# Patient Record
Sex: FEMALE | Race: WHITE | NOT HISPANIC OR LATINO | Employment: UNEMPLOYED | URBAN - METROPOLITAN AREA
[De-identification: names, ages, dates, MRNs, and addresses within clinical notes are randomized per-mention and may not be internally consistent; named-entity substitution may affect disease eponyms.]

---

## 2017-02-01 ENCOUNTER — APPOINTMENT (OUTPATIENT)
Dept: RADIOLOGY | Facility: MEDICAL CENTER | Age: 47
End: 2017-02-01
Attending: EMERGENCY MEDICINE
Payer: COMMERCIAL

## 2017-02-01 ENCOUNTER — APPOINTMENT (OUTPATIENT)
Dept: RADIOLOGY | Facility: MEDICAL CENTER | Age: 47
End: 2017-02-01
Attending: INTERNAL MEDICINE
Payer: COMMERCIAL

## 2017-02-01 ENCOUNTER — HOSPITAL ENCOUNTER (OUTPATIENT)
Facility: MEDICAL CENTER | Age: 47
End: 2017-02-02
Attending: EMERGENCY MEDICINE | Admitting: INTERNAL MEDICINE
Payer: COMMERCIAL

## 2017-02-01 ENCOUNTER — RESOLUTE PROFESSIONAL BILLING HOSPITAL PROF FEE (OUTPATIENT)
Dept: HOSPITALIST | Facility: MEDICAL CENTER | Age: 47
End: 2017-02-01
Payer: COMMERCIAL

## 2017-02-01 DIAGNOSIS — E87.1 HYPONATREMIA: ICD-10-CM

## 2017-02-01 DIAGNOSIS — M54.12 CERVICAL RADICULOPATHY, ACUTE: ICD-10-CM

## 2017-02-01 DIAGNOSIS — Z78.9 ALCOHOL USE: ICD-10-CM

## 2017-02-01 PROBLEM — R20.2 INTERMITTENT TINGLING SENSATION OF LEFT HAND AND FOOT: Status: ACTIVE | Noted: 2017-02-01

## 2017-02-01 LAB
ALBUMIN SERPL BCP-MCNC: 4.3 G/DL (ref 3.2–4.9)
ALBUMIN/GLOB SERPL: 1.5 G/DL
ALP SERPL-CCNC: 48 U/L (ref 30–99)
ALT SERPL-CCNC: 17 U/L (ref 2–50)
ANION GAP SERPL CALC-SCNC: 11 MMOL/L (ref 0–11.9)
AST SERPL-CCNC: 19 U/L (ref 12–45)
BASOPHILS # BLD AUTO: 1.9 % (ref 0–1.8)
BASOPHILS # BLD: 0.09 K/UL (ref 0–0.12)
BILIRUB SERPL-MCNC: 0.4 MG/DL (ref 0.1–1.5)
BUN SERPL-MCNC: 9 MG/DL (ref 8–22)
CALCIUM SERPL-MCNC: 9.4 MG/DL (ref 8.5–10.5)
CHLORIDE SERPL-SCNC: 92 MMOL/L (ref 96–112)
CO2 SERPL-SCNC: 22 MMOL/L (ref 20–33)
CREAT SERPL-MCNC: 0.68 MG/DL (ref 0.5–1.4)
EOSINOPHIL # BLD AUTO: 0.13 K/UL (ref 0–0.51)
EOSINOPHIL NFR BLD: 2.8 % (ref 0–6.9)
ERYTHROCYTE [DISTWIDTH] IN BLOOD BY AUTOMATED COUNT: 39.4 FL (ref 35.9–50)
GFR SERPL CREATININE-BSD FRML MDRD: >60 ML/MIN/1.73 M 2
GLOBULIN SER CALC-MCNC: 2.8 G/DL (ref 1.9–3.5)
GLUCOSE SERPL-MCNC: 83 MG/DL (ref 65–99)
HCT VFR BLD AUTO: 36.2 % (ref 37–47)
HGB BLD-MCNC: 12.9 G/DL (ref 12–16)
IMM GRANULOCYTES # BLD AUTO: 0.01 K/UL (ref 0–0.11)
IMM GRANULOCYTES NFR BLD AUTO: 0.2 % (ref 0–0.9)
LYMPHOCYTES # BLD AUTO: 1.73 K/UL (ref 1–4.8)
LYMPHOCYTES NFR BLD: 36.9 % (ref 22–41)
MCH RBC QN AUTO: 33.8 PG (ref 27–33)
MCHC RBC AUTO-ENTMCNC: 35.6 G/DL (ref 33.6–35)
MCV RBC AUTO: 94.8 FL (ref 81.4–97.8)
MONOCYTES # BLD AUTO: 0.37 K/UL (ref 0–0.85)
MONOCYTES NFR BLD AUTO: 7.9 % (ref 0–13.4)
NEUTROPHILS # BLD AUTO: 2.36 K/UL (ref 2–7.15)
NEUTROPHILS NFR BLD: 50.3 % (ref 44–72)
NRBC # BLD AUTO: 0 K/UL
NRBC BLD AUTO-RTO: 0 /100 WBC
PLATELET # BLD AUTO: 281 K/UL (ref 164–446)
PMV BLD AUTO: 8.3 FL (ref 9–12.9)
POTASSIUM SERPL-SCNC: 3.2 MMOL/L (ref 3.6–5.5)
PROT SERPL-MCNC: 7.1 G/DL (ref 6–8.2)
RBC # BLD AUTO: 3.82 M/UL (ref 4.2–5.4)
SODIUM SERPL-SCNC: 125 MMOL/L (ref 135–145)
TROPONIN I SERPL-MCNC: <0.01 NG/ML (ref 0–0.04)
WBC # BLD AUTO: 4.7 K/UL (ref 4.8–10.8)

## 2017-02-01 PROCEDURE — 72156 MRI NECK SPINE W/O & W/DYE: CPT

## 2017-02-01 PROCEDURE — 96360 HYDRATION IV INFUSION INIT: CPT

## 2017-02-01 PROCEDURE — G0378 HOSPITAL OBSERVATION PER HR: HCPCS

## 2017-02-01 PROCEDURE — 93005 ELECTROCARDIOGRAM TRACING: CPT | Performed by: EMERGENCY MEDICINE

## 2017-02-01 PROCEDURE — 700102 HCHG RX REV CODE 250 W/ 637 OVERRIDE(OP): Performed by: EMERGENCY MEDICINE

## 2017-02-01 PROCEDURE — A9579 GAD-BASE MR CONTRAST NOS,1ML: HCPCS | Performed by: INTERNAL MEDICINE

## 2017-02-01 PROCEDURE — A9270 NON-COVERED ITEM OR SERVICE: HCPCS | Performed by: INTERNAL MEDICINE

## 2017-02-01 PROCEDURE — 84484 ASSAY OF TROPONIN QUANT: CPT

## 2017-02-01 PROCEDURE — 80053 COMPREHEN METABOLIC PANEL: CPT

## 2017-02-01 PROCEDURE — 85025 COMPLETE CBC W/AUTO DIFF WBC: CPT

## 2017-02-01 PROCEDURE — 700105 HCHG RX REV CODE 258: Performed by: INTERNAL MEDICINE

## 2017-02-01 PROCEDURE — 99220 PR INITIAL OBSERVATION CARE,LEVL III: CPT | Performed by: INTERNAL MEDICINE

## 2017-02-01 PROCEDURE — A9270 NON-COVERED ITEM OR SERVICE: HCPCS | Performed by: EMERGENCY MEDICINE

## 2017-02-01 PROCEDURE — 700102 HCHG RX REV CODE 250 W/ 637 OVERRIDE(OP): Performed by: INTERNAL MEDICINE

## 2017-02-01 PROCEDURE — 36415 COLL VENOUS BLD VENIPUNCTURE: CPT

## 2017-02-01 PROCEDURE — 72125 CT NECK SPINE W/O DYE: CPT

## 2017-02-01 PROCEDURE — 99285 EMERGENCY DEPT VISIT HI MDM: CPT

## 2017-02-01 PROCEDURE — 700117 HCHG RX CONTRAST REV CODE 255: Performed by: INTERNAL MEDICINE

## 2017-02-01 PROCEDURE — 96361 HYDRATE IV INFUSION ADD-ON: CPT

## 2017-02-01 PROCEDURE — 71010 DX-CHEST-PORTABLE (1 VIEW): CPT

## 2017-02-01 RX ORDER — DOCUSATE SODIUM 100 MG/1
100 CAPSULE, LIQUID FILLED ORAL EVERY MORNING
Status: DISCONTINUED | OUTPATIENT
Start: 2017-02-01 | End: 2017-02-02 | Stop reason: HOSPADM

## 2017-02-01 RX ORDER — METHYLPREDNISOLONE 4 MG/1
TABLET ORAL
Qty: 1 KIT | Refills: 0 | Status: SHIPPED | OUTPATIENT
Start: 2017-02-01 | End: 2017-02-02

## 2017-02-01 RX ORDER — ACETAMINOPHEN 325 MG/1
650 TABLET ORAL EVERY 6 HOURS PRN
Status: DISCONTINUED | OUTPATIENT
Start: 2017-02-01 | End: 2017-02-02 | Stop reason: HOSPADM

## 2017-02-01 RX ORDER — BISACODYL 10 MG
10 SUPPOSITORY, RECTAL RECTAL
Status: DISCONTINUED | OUTPATIENT
Start: 2017-02-01 | End: 2017-02-02 | Stop reason: HOSPADM

## 2017-02-01 RX ORDER — AMOXICILLIN 250 MG
1 CAPSULE ORAL
Status: DISCONTINUED | OUTPATIENT
Start: 2017-02-01 | End: 2017-02-02 | Stop reason: HOSPADM

## 2017-02-01 RX ORDER — HEPARIN SODIUM 5000 [USP'U]/ML
5000 INJECTION, SOLUTION INTRAVENOUS; SUBCUTANEOUS EVERY 8 HOURS
Status: DISCONTINUED | OUTPATIENT
Start: 2017-02-01 | End: 2017-02-02 | Stop reason: HOSPADM

## 2017-02-01 RX ORDER — AMOXICILLIN 250 MG
1 CAPSULE ORAL NIGHTLY
Status: DISCONTINUED | OUTPATIENT
Start: 2017-02-01 | End: 2017-02-02 | Stop reason: HOSPADM

## 2017-02-01 RX ORDER — ONDANSETRON 4 MG/1
4 TABLET, ORALLY DISINTEGRATING ORAL EVERY 4 HOURS PRN
Status: DISCONTINUED | OUTPATIENT
Start: 2017-02-01 | End: 2017-02-02 | Stop reason: HOSPADM

## 2017-02-01 RX ORDER — LACTULOSE 20 G/30ML
30 SOLUTION ORAL
Status: DISCONTINUED | OUTPATIENT
Start: 2017-02-01 | End: 2017-02-02 | Stop reason: HOSPADM

## 2017-02-01 RX ORDER — IBUPROFEN 200 MG
1000 TABLET ORAL EVERY 6 HOURS PRN
Status: ON HOLD | COMMUNITY
End: 2017-02-02

## 2017-02-01 RX ORDER — POTASSIUM CHLORIDE 20 MEQ/1
40 TABLET, EXTENDED RELEASE ORAL ONCE
Status: COMPLETED | OUTPATIENT
Start: 2017-02-01 | End: 2017-02-01

## 2017-02-01 RX ORDER — SODIUM CHLORIDE 9 MG/ML
INJECTION, SOLUTION INTRAVENOUS CONTINUOUS
Status: DISCONTINUED | OUTPATIENT
Start: 2017-02-01 | End: 2017-02-02 | Stop reason: HOSPADM

## 2017-02-01 RX ORDER — ONDANSETRON 2 MG/ML
4 INJECTION INTRAMUSCULAR; INTRAVENOUS EVERY 4 HOURS PRN
Status: DISCONTINUED | OUTPATIENT
Start: 2017-02-01 | End: 2017-02-02 | Stop reason: HOSPADM

## 2017-02-01 RX ORDER — HYDROCODONE BITARTRATE AND ACETAMINOPHEN 5; 325 MG/1; MG/1
1-2 TABLET ORAL EVERY 6 HOURS PRN
Qty: 20 TAB | Refills: 0 | Status: SHIPPED | OUTPATIENT
Start: 2017-02-01 | End: 2017-02-02

## 2017-02-01 RX ORDER — OXYCODONE HYDROCHLORIDE 5 MG/1
5 TABLET ORAL EVERY 4 HOURS PRN
Status: DISCONTINUED | OUTPATIENT
Start: 2017-02-01 | End: 2017-02-02 | Stop reason: HOSPADM

## 2017-02-01 RX ORDER — PROMETHAZINE HYDROCHLORIDE 25 MG/1
12.5-25 TABLET ORAL EVERY 4 HOURS PRN
Status: DISCONTINUED | OUTPATIENT
Start: 2017-02-01 | End: 2017-02-02 | Stop reason: HOSPADM

## 2017-02-01 RX ORDER — HYDROCODONE BITARTRATE AND ACETAMINOPHEN 5; 325 MG/1; MG/1
1 TABLET ORAL ONCE
Status: COMPLETED | OUTPATIENT
Start: 2017-02-01 | End: 2017-02-01

## 2017-02-01 RX ORDER — PROMETHAZINE HYDROCHLORIDE 12.5 MG/1
12.5-25 SUPPOSITORY RECTAL EVERY 4 HOURS PRN
Status: DISCONTINUED | OUTPATIENT
Start: 2017-02-01 | End: 2017-02-02 | Stop reason: HOSPADM

## 2017-02-01 RX ORDER — ENEMA 19; 7 G/133ML; G/133ML
1 ENEMA RECTAL
Status: DISCONTINUED | OUTPATIENT
Start: 2017-02-01 | End: 2017-02-02 | Stop reason: HOSPADM

## 2017-02-01 RX ORDER — LISINOPRIL 20 MG/1
20 TABLET ORAL DAILY
Status: DISCONTINUED | OUTPATIENT
Start: 2017-02-02 | End: 2017-02-02 | Stop reason: HOSPADM

## 2017-02-01 RX ADMIN — SODIUM CHLORIDE: 9 INJECTION, SOLUTION INTRAVENOUS at 13:27

## 2017-02-01 RX ADMIN — POTASSIUM CHLORIDE 40 MEQ: 1500 TABLET, EXTENDED RELEASE ORAL at 12:08

## 2017-02-01 RX ADMIN — GADODIAMIDE 15 ML: 287 INJECTION INTRAVENOUS at 12:54

## 2017-02-01 RX ADMIN — OXYCODONE HYDROCHLORIDE 5 MG: 5 TABLET ORAL at 17:09

## 2017-02-01 RX ADMIN — OXYCODONE HYDROCHLORIDE 5 MG: 5 TABLET ORAL at 12:08

## 2017-02-01 RX ADMIN — HYDROCODONE BITARTRATE AND ACETAMINOPHEN 1 TABLET: 5; 325 TABLET ORAL at 08:29

## 2017-02-01 RX ADMIN — OXYCODONE HYDROCHLORIDE 5 MG: 5 TABLET ORAL at 21:10

## 2017-02-01 ASSESSMENT — ENCOUNTER SYMPTOMS
BACK PAIN: 1
TINGLING: 1
FALLS: 0
NECK PAIN: 1

## 2017-02-01 ASSESSMENT — PAIN SCALES - GENERAL
PAINLEVEL_OUTOF10: 6
PAINLEVEL_OUTOF10: 6
PAINLEVEL_OUTOF10: 5
PAINLEVEL_OUTOF10: 4
PAINLEVEL_OUTOF10: 6

## 2017-02-01 ASSESSMENT — LIFESTYLE VARIABLES
ON A TYPICAL DAY WHEN YOU DRINK ALCOHOL HOW MANY DRINKS DO YOU HAVE: 2
TOTAL SCORE: 0
TOTAL SCORE: 0
CONSUMPTION TOTAL: NEGATIVE
HAVE YOU EVER FELT YOU SHOULD CUT DOWN ON YOUR DRINKING: NO
EVER_SMOKED: NEVER
AVERAGE NUMBER OF DAYS PER WEEK YOU HAVE A DRINK CONTAINING ALCOHOL: 3
HOW MANY TIMES IN THE PAST YEAR HAVE YOU HAD 5 OR MORE DRINKS IN A DAY: 0
ALCOHOL_USE: YES
EVER FELT BAD OR GUILTY ABOUT YOUR DRINKING: NO
TOTAL SCORE: 0
HAVE PEOPLE ANNOYED YOU BY CRITICIZING YOUR DRINKING: NO
EVER HAD A DRINK FIRST THING IN THE MORNING TO STEADY YOUR NERVES TO GET RID OF A HANGOVER: NO

## 2017-02-01 NOTE — IP AVS SNAPSHOT
" After Visit Summary                                                                                                                  Name:Justina Melo  Medical Record Number:[id number]  CSN: [id number]    YOB: 1970   Age: 46 y.o.  Sex: female  HT:1.651 m (5' 5\") WT: 66.5 kg (146 lb 9.7 oz)          Admit Date: 2/1/2017     Discharge Date:   Today's Date: 2/2/2017  Attending Doctor:  [facility]                  Allergies:  Review of patient's allergies indicates no known allergies.            Discharge Instructions           Cervical Radiculopathy  Cervical radiculopathy happens when a nerve in the neck is pinched or bruised by a slipped (herniated) disk or by arthritic changes in the bones of the cervical spine. This can occur due to an injury or as part of the normal aging process. Pressure on the cervical nerves can cause pain or numbness that runs from your neck all the way down into your arm and fingers.  CAUSES   There are many possible causes, including:  · Injury.  · Muscle tightness in the neck from overuse.  · Swollen, painful joints (arthritis).  · Breakdown or degeneration in the bones and joints of the spine (spondylosis) due to aging.  · Bone spurs that may develop near the cervical nerves.  SYMPTOMS   Symptoms include pain, weakness, or numbness in the affected arm and hand. Pain can be severe or irritating. Symptoms may be worse when extending or turning the neck.  DIAGNOSIS   Your caregiver will ask about your symptoms and do a physical exam. He or she may test your strength and reflexes. X-rays, CT scans, and MRI scans may be needed in cases of injury or if the symptoms do not go away after a period of time. Electromyography (EMG) or nerve conduction testing may be done to study how your nerves and muscles are working.  TREATMENT   Your caregiver may recommend certain exercises to help relieve your symptoms. Cervical radiculopathy can, and often does, get better with time and " treatment. If your problems continue, treatment options may include:  · Wearing a soft collar for short periods of time.  · Physical therapy to strengthen the neck muscles.  · Medicines, such as nonsteroidal anti-inflammatory drugs (NSAIDs), oral corticosteroids, or spinal injections.  · Surgery. Different types of surgery may be done depending on the cause of your problems.  HOME CARE INSTRUCTIONS   · Put ice on the affected area.  · Put ice in a plastic bag.  · Place a towel between your skin and the bag.  · Leave the ice on for 15-20 minutes, 03-04 times a day or as directed by your caregiver.  · If ice does not help, you can try using heat. Take a warm shower or bath, or use a hot water bottle as directed by your caregiver.  · You may try a gentle neck and shoulder massage.  · Use a flat pillow when you sleep.  · Only take over-the-counter or prescription medicines for pain, discomfort, or fever as directed by your caregiver.  · If physical therapy was prescribed, follow your caregiver's directions.  · If a soft collar was prescribed, use it as directed.  SEEK IMMEDIATE MEDICAL CARE IF:   · Your pain gets much worse and cannot be controlled with medicines.  · You have weakness or numbness in your hand, arm, face, or leg.  · You have a high fever or a stiff, rigid neck.  · You lose bowel or bladder control (incontinence).  · You have trouble with walking, balance, or speaking.  MAKE SURE YOU:   · Understand these instructions.  · Will watch your condition.  · Will get help right away if you are not doing well or get worse.     This information is not intended to replace advice given to you by your health care provider. Make sure you discuss any questions you have with your health care provider.     Document Released: 09/12/2002 Document Revised: 03/11/2013 Document Reviewed: 02/11/2016  [employer] Interactive Patient Education ©2016 [employer]        Hyponatremia  Hyponatremia is when the amount of salt (sodium)  in your blood is too low. When sodium levels are low, your cells absorb extra water and they swell. The swelling happens throughout the body, but it mostly affects the brain.  CAUSES  This condition may be caused by:  · Heart, kidney, or liver problems.  · Thyroid problems.  · Adrenal gland problems.  · Metabolic conditions, such as syndrome of inappropriate antidiuretic hormone (SIADH).  · Severe vomiting and diarrhea.  · Certain medicines or illegal drugs.  · Dehydration.  · Drinking too much water.  · Eating a diet that is low in sodium.  · Large burns on your body.  · Sweating.  RISK FACTORS  This condition is more likely to develop in people who:  · Have long-term (chronic) kidney disease.  · Have heart failure.  · Have a medical condition that causes frequent or excessive diarrhea.  · Have metabolic conditions, such as Juan Miguel disease or SIADH.  · Take certain medicines that affect the sodium and fluid balance in the blood. Some of these medicine types include:  ¨ Diuretics.  ¨ NSAIDs.  ¨ Some opioid pain medicines.  ¨ Some antidepressants.  ¨ Some seizure prevention medicines.  SYMPTOMS   Symptoms of this condition include:  · Nausea and vomiting.  · Confusion.  · Lethargy.  · Agitation.  · Headache.  · Seizures.  · Unconsciousness.  · Appetite loss.  · Muscle weakness and cramping.  · Feeling weak or light-headed.  · Having a rapid heart rate.  · Fainting, in severe cases.  DIAGNOSIS  This condition is diagnosed with a medical history and physical exam. You will also have other tests, including:  · Blood tests.  · Urine tests.  TREATMENT  Treatment for this condition depends on the cause. Treatment may include:  · Fluids given through an IV tube that is inserted into one of your veins.  · Medicines to correct the sodium imbalance. If medicines are causing the condition, the medicines will need to be adjusted.  · Limiting water or fluid intake to get the correct sodium balance.  HOME CARE INSTRUCTIONS  · Take  medicines only as directed by your health care provider. Many medicines can make this condition worse. Talk with your health care provider about any medicines that you are currently taking.  · Carefully follow a recommended diet as directed by your health care provider.  · Carefully follow instructions from your health care provider about fluid restrictions.  · Keep all follow-up visits as directed by your health care provider. This is important.  · Do not drink alcohol.  SEEK MEDICAL CARE IF:  · You develop worsening nausea, fatigue, headache, confusion, or weakness.  · Your symptoms go away and then return.  · You have problems following the recommended diet.  SEEK IMMEDIATE MEDICAL CARE IF:  · You have a seizure.  · You faint.  · You have ongoing diarrhea or vomiting.     This information is not intended to replace advice given to you by your health care provider. Make sure you discuss any questions you have with your health care provider.     Document Released: 12/08/2003 Document Revised: 05/03/2016 Document Reviewed: 01/07/2016  [employer] Interactive Patient Education ©2016 [employer]      Please call MD or return to ED for bowel or bladder incontinence, difficulty walking, loss of sensation/movement in arms/legs or other concerns.    Your appointments     Mar 10, 2017  8:40 AM   New Patient with Jesse Andrade M.D.   [facility] (--)    [address], [address]  [facility] [address] [phone]           Please bring Photo ID, Insurance Cards, All Medication Bottles and copies of any legal documents (such as Living Will, Power of ) If speaking a language besides English please bring an adult . Please arrive 30 minutes prior for check in and registration. You will be receiving a confirmation call a few days before your appointment from our automated call confirmation system.              Follow-up Information     1. Follow up with Pablo Noguera M.D.. Schedule an  appointment as soon as possible for a visit in 2 days.    Specialty:  Neurosurgery    Contact information    [address]  [address]  [place] [address]  [phone]          2. Follow up with Preeti Medina M.D.. Schedule an appointment as soon as possible for a visit in 1 week.    Specialty:  Family Medicine    Contact information    [address]  [address]  [place] [address]  [phone]           Discharge Medication Instructions:    Below are the medications your physician expects you to take upon discharge:    Review all your home medications and newly ordered medications with your doctor and/or pharmacist. Follow medication instructions as directed by your doctor and/or pharmacist.    Please keep your medication list with you and share with your physician.               Medication List      START taking these medications        Instructions    meloxicam 7.5 MG Tabs   Commonly known as:  MOBIC    Take 1 Tab by mouth 2 times daily with meals as needed for Moderate Pain or Severe Pain.   Dose:  7.5 mg       predniSONE 20 MG Tabs   Last time this was given:  50 mg on 2/2/2017 12:31 PM   Commonly known as:  DELTASONE    Take 2 Tabs by mouth every day for 3 days.   Dose:  40 mg         CONTINUE taking these medications        Instructions    B-12 PO    Take 2 Tabs by mouth every day.   Dose:  2 Tab       DAILY DIET SUPPORT PO    Take 2 Tabs by mouth every day.   Dose:  2 Tab       GARCINIA CAMBOGIA-CHROMIUM PO    Take 2 Tabs by mouth every day.   Dose:  2 Tab       GREEN COFFEE BEAN PO    Take 1 Cap by mouth every day.   Dose:  1 Cap       lisinopril 20 MG Tabs   Last time this was given:  20 mg on 2/2/2017  7:53 AM   Commonly known as:  PRINIVIL    Take 20 mg by mouth every day.    Indications: High Blood Pressure   Dose:  20 mg       SUDAFED 24 HOUR PO    Take 1 Tab by mouth every day.   Dose:  1 Tab         STOP taking these medications     ibuprofen 200 MG Tabs   Commonly known as:  MOTRIN               Instructions                 Diet / Nutrition:    Follow any diet instructions given to you by your doctor or the dietician, including how much salt (sodium) you are allowed each day.    If you are overweight, talk to your doctor about a weight reduction plan.    Activity:    Remain physically active following your doctor's instructions about exercise and activity.    Rest often.     Any time you become even a little tired or short of breath, SIT DOWN and rest.    Worsening Symptoms:    Report any of the following signs and symptoms to the doctor's office immediately:    *Pain of jaw, arm, or neck  *Chest pain not relieved by medication                               *Dizziness or loss of consciousness  *Difficulty breathing even when at rest   *More tired than usual                                       *Bleeding drainage or swelling of surgical site  *Swelling of feet, ankles, legs or stomach                 *Fever (>100ºF)  *Pink or blood tinged sputum  *Weight gain (3lbs/day or 5lbs /week)           *Shock from internal defibrillator (if applicable)  *Palpitations or irregular heartbeats                *Cool and/or numb extremities    Stroke Awareness    Common Risk Factors for Stroke include:    Age  Atrial Fibrillation  Carotid Artery Stenosis  Diabetes Mellitus  Excessive alcohol consumption  High blood pressure  Overweight   Physical inactivity  Smoking    Warning signs and symptoms of a stroke include:    *Sudden numbness or weakness of the face, arm or leg (especially on one side of the body).  *Sudden confusion, trouble speaking or understanding.  *Sudden trouble seeing in one or both eyes.  *Sudden trouble walking, dizziness, loss of balance or coordination.Sudden severe headache with no known cause.    It is very important to get treatment quickly when a stroke occurs. If you experience any of the above warning signs, call 911 immediately.                   Disclaimer         Quit Smoking / Tobacco Use:    I understand the use  of any tobacco products increases my chance of suffering from future heart disease or stroke and could cause other illnesses which may shorten my life. Quitting the use of tobacco products is the single most important thing I can do to improve my health. For further information on smoking / tobacco cessation call a Toll Free Quit Line at 1-298.785.2219 (*[employer] [place]) or 1-463.381.6020 (American [employer] Association) or you can access the web based program at www.lungusa.org.    [place] Tobacco Users Help Line:  (557) 450-2916       Toll Free: 1-946.232.3127  Quit Tobacco Program [facility] (354)487-6011    Crisis Hotline:    [place] Crisis Hotline:  3-222-BHYFALL or 1-308.461.5831    [place] Crisis Hotline:    1-886.244.8525 or 684-233-4146    Discharge Survey:   Thank you for choosing [facility]. We hope we did everything we could to make your hospital stay a pleasant one. You may be receiving a phone survey and we would appreciate your time and participation in answering the questions. Your input is very valuable to us in our efforts to improve our service to our patients and their families.        My signature on this form indicates that:    1. I have reviewed and understand the above information.  2. My questions regarding this information have been answered to my satisfaction.  3. I have formulated a plan with my discharge nurse to obtain my prescribed medications for home.                  Disclaimer         __________________________________                     __________       ________                       Patient Signature                                                 Date                    Time

## 2017-02-01 NOTE — ED NOTES
"Med rec updated and complete  Allergies reviewed  Pt states \"No antibiotics in the last 30 days\".    "

## 2017-02-01 NOTE — ED NOTES
Verbalizes understanding of POC. EKG completed. PIV established, blood sent to lab. VSS, call light within reach.

## 2017-02-01 NOTE — ED NOTES
"Room 21      .  Chief Complaint   Patient presents with   • Hip Pain     (R) side.    hx of scoliosis   • Arm Pain     (L) arm,  \"pins and needles\"- type pain that starts at shoulder and radiates into hand.         "

## 2017-02-01 NOTE — PROGRESS NOTES
Pt arrived to unit via gurney at 1155. Pt oriented to room, unit, and plan of care. Admit profile and assessment complete.Pt c/o pain, medicated per MAR; All questions answered at this time. Call light within reach; fall precautions in place.

## 2017-02-01 NOTE — DISCHARGE PLANNING
Care Transition Team Assessment    Information Source  Orientation : Oriented x 4  Who is responsible for making decisions for patient? : Patient  Source of Information: Patient  Primary Caregiver for Others?: No  Why do you believe you were admitted?:  (tingling left arm)    Readmission Evaluation  Is this a readmission?: No    Elopement Risk  Legal Hold: No    Interdisciplinary Discharge Planning  Does Admitting Nurse Feel This Could be a Complex Discharge?: No  Primary Care Physician:  (Preeti Medina)  Support Systems: Spouse / Significant Other    Discharge Preparedness  [facility] resources needed?: None  Prescription Coverage: Yes    Functional Assesment  Prior Functional Level: Ambulatory    Finances  Source of Income: Employed  Medical Insurance Coverage: Private insurance              Advance Directive  Advance Directive?:  (declines)    Domestic Abuse  Have you ever been the victim of abuse or violence?: No    Psychological Assessment  Suspect Abuse: No    Discharge Risks or Barriers  Discharge risks or barriers?: No    Anticipated Discharge Information  Anticipated discharge disposition: Home  Discharge Address:  (FS)  Discharge Contact Phone Number:  (SO on FS)

## 2017-02-01 NOTE — H&P
PRIMARY CARE PHYSICIAN:  Preeti Medina MD    CHIEF COMPLAINT:  Left upper extremity tingling and numbness sensation.    HISTORY OF PRESENT ILLNESS:  Patient is a 46-year-old female who came to the   ER with above.  Per patient, she has been having pain, tingling, numbness   sensation as well as occasional weakness in her left arm area for the past few   months.  Today, she woke up with severe pain in her neck area as well as   severe tingling and numbness sensation in her hands and she finally decided to   come to the ER for checkup.  In the ER, she has a CT scan of the cervical   done and found to have degenerative changes at the C5-C7 area as well as C5-C6   neuroforaminal stenosis.  Due to her symptoms with the CT findings and   discussion with ER doctor, we will get an MRI of the cervical spine as well as   neurology consult by ER.  Also, she was found to have hyponatremia with   sodium of 125.  Apart from that, patient denied any fever, chills, chest pain,   palpitation, or any neurological deficit.    ALLERGY HISTORY:  No known drug allergy.    PAST MEDICAL HISTORY:  Essential hypertension.    PAST SURGICAL HISTORY:  Right shoulder surgery and .    SOCIAL HISTORY:  Patient denies smoking.  Drinks a few glasses of wine and a   few glasses of whiskey a day.  Denies any illicit drug abuse.    OUTPATIENT MEDICATIONS:  Lisinopril 20 mg daily.    FAMILY HISTORY:  No pertinent family history.    PHYSICAL EXAMINATION:  VITAL SIGNS:  Temperature 99.1, pulse rate 97, respiratory rate 14, blood   pressure 119/89, and satting 98% on room air.  GENERAL:  Alert, communicative.  HEENT:  Normocephalic, atraumatic.  NECK:  Supple.  No neck gland enlargement.  CARDIOVASCULAR:  Normal first and second sound.  No murmur.  RESPIRATORY:  Normal respiratory effort.  No wheezing.  ABDOMEN:  Soft, bowel sounds present, nontender.  CENTRAL NERVOUS SYSTEM:  Cranial nerves II-XII intact.  No neurological    deficit.  EXTREMITIES:  No edema, clubbing, or cyanosis.  PSYCHIATRIC:  Normal affect and mood.  Alert and oriented x4.  SKIN:  Warm and moist.    LABORATORY DATA:  CBC:  WBC 4.7, hemoglobin 12.9, platelet 281.  Sodium 125,   potassium 3.2, chloride 92, BUN 9, creatinine 0.68.    IMAGING:  CT scan of the cervical spine as above.    ASSESSMENT AND PLAN:  1.  Neck pain with left upper extremity tingling and numbness sensation with   occasional weakness.  A CT scan of the spine showed C5-C7 degenerative disease   with C5-C6 foraminal stenosis.  We will get an MRI of the cervical spine for   her as well as neurosurgeon consult by ER physician.  Please follow up after   consult.  2.  Hyponatremia, likely related to ibuprofen she takes at home.  Patient will   be started on IV fluids.  We will follow CMP in the morning.  3.  Hypokalemia, replete as needed.  4.  Essential hypertension, stable.  5.  Deep venous thrombosis prophylaxis, on heparin.  6.  Patient is a full code.       ____________________________________     MD CYNTHIA CAMBPELL / OLEG    DD:  02/01/2017 11:15:44  DT:  02/01/2017 13:42:21    D#:  [id number]  Job#:  [id number]

## 2017-02-01 NOTE — ED NOTES
"[facility]  Chief Complaint   Patient presents with   • Hip Pain     (R) side.    hx of scoliosis   • Arm Pain     (L) arm,  \"pins and needles\"- type pain that starts at shoulder and radiates into hand.       Pt ambulatory to triage with above complaint.  VSS. Symptoms have been intermittent since Nov/ Dec,  No relief with ibuprofen and unable to sleep now.  Pt returned to lobby, educated on triage process, and to inform staff of any changes or concerns.     "

## 2017-02-01 NOTE — ED PROVIDER NOTES
"ED Provider Note    Scribed for Lizandro Martínez M.D. by Karuna Helm. 2/1/2017, 6:59 AM.    Primary care provider: Preeti Medina M.D.  Means of arrival: walk-in  History obtained from: patient  History limited by: none     CHIEF COMPLAINT  Chief Complaint   Patient presents with   • Hip Pain     (R) side.    hx of scoliosis   • Arm Pain     (L) arm,  \"pins and needles\"- type pain that starts at shoulder and radiates into hand.         HPI  Justina Melo is a 46 y.o. female with history of scoliosis who presents to the Emergency Department for evaluation of left arm pain that began 2 months ago. Patient reports history of orthopedic surgery, she states she has \"rods in her back\". Patient describes the left arm pain as numbness and tingling and is intermittent in nature. She states the pain starts at left shoulder and radiates into her hand. Patient reports associated neck and back pain.  She also has associated right arm pain.  Is also wax and wane only the center.  Elbow not only down her hand.   Denies recent falls or injuries. Per patient, she has been more active at work which as exacerbated the pain. She states she has taken pain medication but denies relief of symptoms. She reports history of chronic right hip pain.  Patient reports history of hypertension.  Patient reports history of alcohol use, she states \"has a drink per day\".     REVIEW OF SYSTEMS  Review of Systems   Musculoskeletal: Positive for back pain and neck pain. Negative for falls.        Right arm pain with numbness and tingling    Neurological: Positive for tingling.       PAST MEDICAL HISTORY   has a past medical history of Hypertension.    SURGICAL HISTORY   has past surgical history that includes other orthopedic surgery and gyn surgery.    SOCIAL HISTORY  Social History   Substance Use Topics   • Smoking status: Never Smoker    • Smokeless tobacco: None   • Alcohol Use: Yes      Comment: moderate drinker. drink per day      History " "  Drug Use No       FAMILY HISTORY  History reviewed. No pertinent family history.    CURRENT MEDICATIONS  Home Medications     Reviewed by Carolina Garcia R.N. (Registered Nurse) on 02/01/17 at 0657  Med List Status: Complete    Medication Last Dose Status    lisinopril (PRINIVIL) 20 MG TABS 1/31/2017 Active                ALLERGIES  No Known Allergies    PHYSICAL EXAM  VITAL SIGNS: /79 mmHg  Pulse 97  Temp(Src) 37.3 °C (99.1 °F)  Resp 14  Ht 1.651 m (5' 5\")  Wt 66.5 kg (146 lb 9.7 oz)  BMI 24.40 kg/m2  SpO2 97%  LMP  (Approximate)  Vitals reviewed.  Constitutional: Well developed, Well nourished, No acute distress, Non-toxic appearance.   HENT: Normocephalic, Atraumatic, Bilateral external ears normal, Oropharynx moist, No oral exudates, Nose normal.   Eyes: PERRL, EOMI, Conjunctiva normal, No discharge.   Neck: Normal range of motion, No tenderness, Supple, No stridor.   Cardiovascular: Normal heart rate, Normal rhythm, No murmurs, No rubs, No gallops.   Thorax & Lungs: Normal breath sounds, No respiratory distress, No wheezing  Abdomen: Bowel sounds normal, Soft, No tenderness  Skin: Warm, Dry, No erythema, No rash.   Back: No tenderness, No CVA tenderness.   Musculoskeletal: Good range of motion in all major joints. No edema. No tenderness to palpation or major deformities noted. DTR lower extremities .  Good pulses in all extremities.  Neurologic: Alert, Normal motor function, Normal sensory function, No focal deficits noted.  Upper shoulders have symmetric thumb raise, finger spread, grass, wrist dorsi and volar flexion, elbow triceps, flexion, extension, and normal subjective sensation.  Psychiatric: Affect normal    LABS  Results for orders placed or performed during the hospital encounter of 02/01/17   CBC WITH DIFFERENTIAL   Result Value Ref Range    WBC 4.7 (L) 4.8 - 10.8 K/uL    RBC 3.82 (L) 4.20 - 5.40 M/uL    Hemoglobin 12.9 12.0 - 16.0 g/dL    Hematocrit 36.2 (L) 37.0 - 47.0 %    MCV " 94.8 81.4 - 97.8 fL    MCH 33.8 (H) 27.0 - 33.0 pg    MCHC 35.6 (H) 33.6 - 35.0 g/dL    RDW 39.4 35.9 - 50.0 fL    Platelet Count 281 164 - 446 K/uL    MPV 8.3 (L) 9.0 - 12.9 fL    Neutrophils-Polys 50.30 44.00 - 72.00 %    Lymphocytes 36.90 22.00 - 41.00 %    Monocytes 7.90 0.00 - 13.40 %    Eosinophils 2.80 0.00 - 6.90 %    Basophils 1.90 (H) 0.00 - 1.80 %    Immature Granulocytes 0.20 0.00 - 0.90 %    Nucleated RBC 0.00 /100 WBC    Neutrophils (Absolute) 2.36 2.00 - 7.15 K/uL    Lymphs (Absolute) 1.73 1.00 - 4.80 K/uL    Monos (Absolute) 0.37 0.00 - 0.85 K/uL    Eos (Absolute) 0.13 0.00 - 0.51 K/uL    Baso (Absolute) 0.09 0.00 - 0.12 K/uL    Immature Granulocytes (abs) 0.01 0.00 - 0.11 K/uL    NRBC (Absolute) 0.00 K/uL   COMP METABOLIC PANEL   Result Value Ref Range    Sodium 125 (L) 135 - 145 mmol/L    Potassium 3.2 (L) 3.6 - 5.5 mmol/L    Chloride 92 (L) 96 - 112 mmol/L    Co2 22 20 - 33 mmol/L    Anion Gap 11.0 0.0 - 11.9    Glucose 83 65 - 99 mg/dL    Bun 9 8 - 22 mg/dL    Creatinine 0.68 0.50 - 1.40 mg/dL    Calcium 9.4 8.5 - 10.5 mg/dL    AST(SGOT) 19 12 - 45 U/L    ALT(SGPT) 17 2 - 50 U/L    Alkaline Phosphatase 48 30 - 99 U/L    Total Bilirubin 0.4 0.1 - 1.5 mg/dL    Albumin 4.3 3.2 - 4.9 g/dL    Total Protein 7.1 6.0 - 8.2 g/dL    Globulin 2.8 1.9 - 3.5 g/dL    A-G Ratio 1.5 g/dL   TROPONIN   Result Value Ref Range    Troponin I <0.01 0.00 - 0.04 ng/mL   ESTIMATED GFR   Result Value Ref Range    GFR If African American >60 >60 mL/min/1.73 m 2    GFR If Non African American >60 >60 mL/min/1.73 m 2   EKG (NOW)   Result Value Ref Range    Report       [facility] Emergency Dept.    Test Date:  2017  Pt Name:    KITA TORRES                Department: ER  MRN:        [id number]                      Room:        21  Gender:     F                            Technician: [id number]  :        1970                   Requested By:BRIDGET FELIX  Order #:    [id number]                     Reading MD:    Measurements  Intervals                                Axis  Rate:       90                           P:          55  MA:         172                          QRS:        24  QRSD:       92                           T:          24  QT:         396  QTc:        485    Interpretive Statements  SINUS RHYTHM  BORDERLINE LOW VOLTAGE IN FRONTAL LEADS  No previous ECG available for comparison       All labs reviewed by me.    EKG Interpretation  Interpreted by me    Rhythm:  Normal sinus rhythm   Rate: 90  Axis: normal  Ectopy: none  Conduction: normal  ST Segments: no acute change  T Waves: no acute change  Q Waves: none  Clinical Impression: Normal EKG without acute changes      RADIOLOGY  CT-CSPINE WITHOUT PLUS RECONS   Final Result      1.  No acute fracture or dislocation cervical spine.   2.  Multilevel degenerative disc disease especially at C5-6 and C6-7. Moderate left C5-6 neural foraminal stenosis.   3.  3 mm T1 anterolisthesis relative to T2 and marked T1/T2 facet joint arthritic changes.      DX-CHEST-PORTABLE (1 VIEW)   Final Result      No consolidation.        The radiologist's interpretation of all radiological studies have been reviewed by me.    COURSE & MEDICAL DECISION MAKING  Pertinent Labs & Imaging studies reviewed. (See chart for details)    6:59 AM Patient seen and examined at bedside. The patient presents with right arm pain and left hip pain, and the differential diagnosis includes but is not limited to CVA, brain mass, DJD of the neck.  Cervical disc, likely of the [place] or other.. Ordered for CT-C Spine, DX-Chest, Estimated GFR, Troponin, CBC with differential, CMP, EKG to evaluate. Patient will be treated with Norco 5-325 mg for her symptoms.      9:54 AM Patient was reevaluated at bedside. Discussed lab and radiology  results with the patient, show moderate arthritis and low sodium levels. I discussed that patient needs a prompt follow up with her primary care provider and to  return to the ED for wo. She understands and will comply.     The patient has arm pain, numbness, tingling, weakness.  This is bad at times.  Is usually in both arms, but is worse in the left than the right.  Suspect that she has some degree of cervical radiculopathy or myelopathy.  A CT was obtained, which is some significant degeneration.  X-ray may be a cause.  The patient's neurologic exam is grossly normal.  At this time.  She was cleared, the pain numbness and tingling.  This is been present for 2 months.  It never completely goes well, though is worse at times.  She is EKG and troponin are negative.  Don't think this is cardiac.  She has hyponatremia, which was a concern for possible malignancy or other processes.  Certainly, there is no evidence of pneumonia or mass on her chest on his chest x-ray.  The patient requires further workup.  I've given her instructions about hyponatremia, she should decrease her alcohol use and increase her salt intake.    The patient is unable to see her doctor for some time.  She does not see her in 2 years and she is not sure she can get a prompt follow-up appointment.  I've asked to schedule her comply to arrange neurology primary care follow-up.  I offered admission for pain control, but she refuses.  I think she requires an outpatient.  Her hyponatremia is borderline consumer.  She is awake, alert, and tolerating it well.  I think this can be managed with increasing her salt intake and increasing her fluid intake.  She seems agreeable with this.      Primary disposition the patient is requesting to stay for pain control.  She'll be admitted for pain control, treatment of her hyponatremia and further evaluation of her cervical radiculopathy.  She is admitted to the hospitalist.    DISPOSITION:  Min to hospice in fair condition.    FOLLOW UP:  No follow-up provider specified.    OUTPATIENT MEDICATIONS:  New Prescriptions    No medications on file           FINAL  IMPRESSION  1. Cervical radiculopathy, acute    2. Hyponatremia    3. Alcohol use (HCC)          I, Karuna Helm (Scribe), am scribing for, and in the presence of, Lizandro Martínez M.D..    Electronically signed by: Karuna Helm (Scribe), 2/1/2017    ILizandro M.D. personally performed the services described in this documentation, as scribed by Karuna Helm in my presence, and it is both accurate and complete.    The note accurately reflects work and decisions made by me.  Lizandro Martínez  2/1/2017  2:11 PM

## 2017-02-02 ENCOUNTER — PATIENT OUTREACH (OUTPATIENT)
Dept: HEALTH INFORMATION MANAGEMENT | Facility: OTHER | Age: 47
End: 2017-02-02

## 2017-02-02 VITALS
HEIGHT: 65 IN | TEMPERATURE: 97.4 F | BODY MASS INDEX: 24.43 KG/M2 | DIASTOLIC BLOOD PRESSURE: 87 MMHG | HEART RATE: 82 BPM | SYSTOLIC BLOOD PRESSURE: 120 MMHG | OXYGEN SATURATION: 99 % | WEIGHT: 146.61 LBS | RESPIRATION RATE: 14 BRPM

## 2017-02-02 LAB
ALBUMIN SERPL BCP-MCNC: 3.8 G/DL (ref 3.2–4.9)
ALBUMIN/GLOB SERPL: 1.5 G/DL
ALP SERPL-CCNC: 36 U/L (ref 30–99)
ALT SERPL-CCNC: 12 U/L (ref 2–50)
ANION GAP SERPL CALC-SCNC: 12 MMOL/L (ref 0–11.9)
AST SERPL-CCNC: 14 U/L (ref 12–45)
BILIRUB SERPL-MCNC: 0.6 MG/DL (ref 0.1–1.5)
BUN SERPL-MCNC: 12 MG/DL (ref 8–22)
CALCIUM SERPL-MCNC: 9.2 MG/DL (ref 8.5–10.5)
CHLORIDE SERPL-SCNC: 104 MMOL/L (ref 96–112)
CO2 SERPL-SCNC: 20 MMOL/L (ref 20–33)
CREAT SERPL-MCNC: 0.67 MG/DL (ref 0.5–1.4)
EKG IMPRESSION: NORMAL
ERYTHROCYTE [DISTWIDTH] IN BLOOD BY AUTOMATED COUNT: 42.3 FL (ref 35.9–50)
GFR SERPL CREATININE-BSD FRML MDRD: >60 ML/MIN/1.73 M 2
GLOBULIN SER CALC-MCNC: 2.5 G/DL (ref 1.9–3.5)
GLUCOSE SERPL-MCNC: 92 MG/DL (ref 65–99)
HCT VFR BLD AUTO: 34.8 % (ref 37–47)
HGB BLD-MCNC: 12.3 G/DL (ref 12–16)
MCH RBC QN AUTO: 34.5 PG (ref 27–33)
MCHC RBC AUTO-ENTMCNC: 35.3 G/DL (ref 33.6–35)
MCV RBC AUTO: 97.5 FL (ref 81.4–97.8)
PLATELET # BLD AUTO: 257 K/UL (ref 164–446)
PMV BLD AUTO: 8.3 FL (ref 9–12.9)
POTASSIUM SERPL-SCNC: 4.1 MMOL/L (ref 3.6–5.5)
PROT SERPL-MCNC: 6.3 G/DL (ref 6–8.2)
RBC # BLD AUTO: 3.57 M/UL (ref 4.2–5.4)
SODIUM SERPL-SCNC: 136 MMOL/L (ref 135–145)
WBC # BLD AUTO: 4.7 K/UL (ref 4.8–10.8)

## 2017-02-02 PROCEDURE — 85027 COMPLETE CBC AUTOMATED: CPT

## 2017-02-02 PROCEDURE — 96361 HYDRATE IV INFUSION ADD-ON: CPT

## 2017-02-02 PROCEDURE — G0378 HOSPITAL OBSERVATION PER HR: HCPCS

## 2017-02-02 PROCEDURE — 80053 COMPREHEN METABOLIC PANEL: CPT

## 2017-02-02 PROCEDURE — 700102 HCHG RX REV CODE 250 W/ 637 OVERRIDE(OP): Performed by: INTERNAL MEDICINE

## 2017-02-02 PROCEDURE — 700111 HCHG RX REV CODE 636 W/ 250 OVERRIDE (IP): Performed by: STUDENT IN AN ORGANIZED HEALTH CARE EDUCATION/TRAINING PROGRAM

## 2017-02-02 PROCEDURE — A9270 NON-COVERED ITEM OR SERVICE: HCPCS | Performed by: INTERNAL MEDICINE

## 2017-02-02 RX ORDER — PREDNISONE 20 MG/1
40 TABLET ORAL DAILY
Qty: 6 TAB | Refills: 0 | Status: SHIPPED | OUTPATIENT
Start: 2017-02-02 | End: 2017-02-05

## 2017-02-02 RX ORDER — PREDNISONE 20 MG/1
50 TABLET ORAL DAILY
Status: DISCONTINUED | OUTPATIENT
Start: 2017-02-02 | End: 2017-02-02 | Stop reason: HOSPADM

## 2017-02-02 RX ORDER — MELOXICAM 7.5 MG/1
7.5 TABLET ORAL
Status: DISCONTINUED | OUTPATIENT
Start: 2017-02-02 | End: 2017-02-02 | Stop reason: HOSPADM

## 2017-02-02 RX ORDER — MELOXICAM 7.5 MG/1
7.5 TABLET ORAL
Qty: 30 TAB | Refills: 0 | Status: SHIPPED | OUTPATIENT
Start: 2017-02-02 | End: 2017-11-03

## 2017-02-02 RX ADMIN — OXYCODONE HYDROCHLORIDE 5 MG: 5 TABLET ORAL at 06:23

## 2017-02-02 RX ADMIN — LISINOPRIL 20 MG: 20 TABLET ORAL at 07:53

## 2017-02-02 RX ADMIN — OXYCODONE HYDROCHLORIDE 5 MG: 5 TABLET ORAL at 01:46

## 2017-02-02 RX ADMIN — OXYCODONE HYDROCHLORIDE 5 MG: 5 TABLET ORAL at 10:34

## 2017-02-02 RX ADMIN — PREDNISONE 50 MG: 20 TABLET ORAL at 12:31

## 2017-02-02 ASSESSMENT — PAIN SCALES - GENERAL
PAINLEVEL_OUTOF10: 6
PAINLEVEL_OUTOF10: 3
PAINLEVEL_OUTOF10: 5

## 2017-02-02 NOTE — PROGRESS NOTES
[facility] [place] [facility] PROGRESS NOTE     Attending: Dr Galloway    Resident: Joel    PATIENT: Justina Melo; [id number]; 1970    ID: 46 y.o. female admitted for workup of LUE pain and paresthesias    SUBJECTIVE: No acute events overnight, reports that her pain is chronic with periodic flares of intensity 2/2 periodic increases in workload at her job.  Pt adds complaints of morning stiffness and RLE paresthesias.     OBJECTIVE:     Filed Vitals:    02/01/17 2349 02/02/17 0400 02/02/17 0753 02/02/17 0821   BP: 117/77 109/80 137/101    Pulse: 99 85  87   Temp: 36.1 °C (97 °F) 36.2 °C (97.1 °F)  36.8 °C (98.2 °F)   Resp: 16 16 16 16   Height:       Weight:       SpO2: 96% 95% 95% 99%     No intake or output data in the 24 hours ending 02/02/17 0851    PE:  General: No acute distress, resting comfortably in bed.  HEENT: NC/AT. MMM  Cardiovascular: s1s2, RRR with no murmur, DP2+, no edema  Respiratory: Symmetrical chest. CTAB with no W/R/C  Abdomen: soft, flat, NT/ND, +BS, no guarding   Neuro:  CROSS, full AROM, 4/5 strength throughout      LABS:  Recent Labs      02/01/17 0715  02/02/17   0150   WBC  4.7*  4.7*   RBC  3.82*  3.57*   HEMOGLOBIN  12.9  12.3   HEMATOCRIT  36.2*  34.8*   MCV  94.8  97.5   MCH  33.8*  34.5*   RDW  39.4  42.3   PLATELETCT  281  257   MPV  8.3*  8.3*   NEUTSPOLYS  50.30   --    LYMPHOCYTES  36.90   --    MONOCYTES  7.90   --    EOSINOPHILS  2.80   --    BASOPHILS  1.90*   --      Recent Labs      02/01/17   0715  02/02/17   0150   SODIUM  125*  136   POTASSIUM  3.2*  4.1   CHLORIDE  92*  104   CO2  22  20   BUN  9  12   CREATININE  0.68  0.67   CALCIUM  9.4  9.2   ALBUMIN  4.3  3.8     Estimated GFR/CRCL = Estimated Creatinine Clearance: 93.4 mL/min (by C-G formula based on Cr of 0.67).  Recent Labs      02/01/17   0715  02/02/17   0150   GLUCOSE  83  92     Recent Labs      02/01/17   0715  02/02/17   0150   ASTSGOT  19  14   ALTSGPT  17  12   TBILIRUBIN  0.4  0.6   ALKPHOSPHAT  48  36    GLOBULIN  2.8  2.5     Recent Labs      02/01/17   0715   TROPONINI  <0.01           Invalid input(s): [id number]  No results for input(s): INR, APTT, FIBRINOGEN in the last 72 hours.    Invalid input(s): DIMER    IMAGING:   CT C-spine  1.  No acute fracture or dislocation cervical spine.  2.  Multilevel degenerative disc disease especially at C5-6 and C6-7. Moderate left C5-6 neural foraminal stenosis.  3.  3 mm T1 anterolisthesis relative to T2 and marked T1/T2 facet joint arthritic changes.    MRI C-spine  1.  Multifocal degenerative disease in the cervical spine    2.  There is minimal anterolisthesis of T1-T2.      MEDS:  Current Facility-Administered Medications   Medication Last Dose   • meloxicam (MOBIC) tablet 7.5 mg     • lisinopril (PRINIVIL) tablet 20 mg 20 mg at 02/02/17 0753   • docusate sodium (COLACE) capsule 100 mg 100 mg at 02/01/17 1215    And   • senna-docusate (PERICOLACE or SENOKOT S) 8.6-50 MG per tablet 1 Tab 1 Tab at 02/01/17 2114    And   • senna-docusate (PERICOLACE or SENOKOT S) 8.6-50 MG per tablet 1 Tab      And   • lactulose 20 GM/30ML solution 30 mL      And   • bisacodyl (DULCOLAX) suppository 10 mg      And   • fleet enema 133 mL     • NS infusion     • heparin injection 5,000 Units 5,000 Units at 02/01/17 2114   • ondansetron (ZOFRAN) syringe/vial injection 4 mg     • ondansetron (ZOFRAN ODT) dispertab 4 mg     • promethazine (PHENERGAN) tablet 12.5-25 mg     • promethazine (PHENERGAN) suppository 12.5-25 mg     • prochlorperazine (COMPAZINE) injection 5-10 mg     • acetaminophen (TYLENOL) tablet 650 mg     • oxycodone immediate-release (ROXICODONE) tablet 5 mg 5 mg at 02/02/17 0623       PROBLEM LIST:  No problems updated.    ASSESSMENT/PLAN:   Pt is a 46F with h/o scoliosis presenting with LUE pain and paresthesias    LUE paresthesias 2/2 cervical stenosis  - CT finds moderate, left-sided, foraminal stenosis at C5-C6.   - MRI finds bilateral forminal stenosis at the same  level, as well as moderate right forminal stenosis at C6-C7  - d/w pt outpt PT versus NSG referral, NSG has been consulted, we will await recs  - we will order mobic for pain, consider steroid burst, as well    Morning stiffness  - pt reports AM joint pain, relieved with hot showers and activity, suspicious for rheumatoid arthritis  - multilevel arthritis noted in scans  - we will consider steroid burst, pt may f/u outpt    RLE paresthesias  - radicular sx, similar to LUE pain, we will consider CT of L-spine  - likely pt will benefit from outpt PT    Hyponatremia  - likely related to ibuprofen she takes at home, pt reports taking 1200mg for LUE pain prior to admission  - resolved, we will follow as needed    Hypokalemia  - treated with kdur, resolved  - follow as needed    DISPO:  Anticipate home today, f/u at [facility]    CODE STATUS: FULL

## 2017-02-02 NOTE — PROGRESS NOTES
Assumed patient care. Report received from RENAY Joseph.  Pt A&Ox4, bilat  strong and equal, no drift.  No facial droop or slurred speech.  Pt reports numbness &  tingling to left hand and foot. Respirations even, unlabored on room air.  Pt reports 3/10 pain, declines mediation at this time.    Call light within reach.  Pt updated on POC, updated communication board.  Family at bedside. Needs met, will continue to monitor.    Hot meal provided

## 2017-02-02 NOTE — PROGRESS NOTES
aao x4. Zay. N/t in L hand and foot. Up self with steady gait. poc discussed with pt. Call light within reach. Calls for assistance. Prn pain med given for neck and back pain, 6/10. Hourly rounding in use

## 2017-02-02 NOTE — DISCHARGE INSTRUCTIONS
Cervical Radiculopathy  Cervical radiculopathy happens when a nerve in the neck is pinched or bruised by a slipped (herniated) disk or by arthritic changes in the bones of the cervical spine. This can occur due to an injury or as part of the normal aging process. Pressure on the cervical nerves can cause pain or numbness that runs from your neck all the way down into your arm and fingers.  CAUSES   There are many possible causes, including:  · Injury.  · Muscle tightness in the neck from overuse.  · Swollen, painful joints (arthritis).  · Breakdown or degeneration in the bones and joints of the spine (spondylosis) due to aging.  · Bone spurs that may develop near the cervical nerves.  SYMPTOMS   Symptoms include pain, weakness, or numbness in the affected arm and hand. Pain can be severe or irritating. Symptoms may be worse when extending or turning the neck.  DIAGNOSIS   Your caregiver will ask about your symptoms and do a physical exam. He or she may test your strength and reflexes. X-rays, CT scans, and MRI scans may be needed in cases of injury or if the symptoms do not go away after a period of time. Electromyography (EMG) or nerve conduction testing may be done to study how your nerves and muscles are working.  TREATMENT   Your caregiver may recommend certain exercises to help relieve your symptoms. Cervical radiculopathy can, and often does, get better with time and treatment. If your problems continue, treatment options may include:  · Wearing a soft collar for short periods of time.  · Physical therapy to strengthen the neck muscles.  · Medicines, such as nonsteroidal anti-inflammatory drugs (NSAIDs), oral corticosteroids, or spinal injections.  · Surgery. Different types of surgery may be done depending on the cause of your problems.  HOME CARE INSTRUCTIONS   · Put ice on the affected area.  · Put ice in a plastic bag.  · Place a towel between your skin and the bag.  · Leave the ice on for 15-20  minutes, 03-04 times a day or as directed by your caregiver.  · If ice does not help, you can try using heat. Take a warm shower or bath, or use a hot water bottle as directed by your caregiver.  · You may try a gentle neck and shoulder massage.  · Use a flat pillow when you sleep.  · Only take over-the-counter or prescription medicines for pain, discomfort, or fever as directed by your caregiver.  · If physical therapy was prescribed, follow your caregiver's directions.  · If a soft collar was prescribed, use it as directed.  SEEK IMMEDIATE MEDICAL CARE IF:   · Your pain gets much worse and cannot be controlled with medicines.  · You have weakness or numbness in your hand, arm, face, or leg.  · You have a high fever or a stiff, rigid neck.  · You lose bowel or bladder control (incontinence).  · You have trouble with walking, balance, or speaking.  MAKE SURE YOU:   · Understand these instructions.  · Will watch your condition.  · Will get help right away if you are not doing well or get worse.     This information is not intended to replace advice given to you by your health care provider. Make sure you discuss any questions you have with your health care provider.     Document Released: 09/12/2002 Document Revised: 03/11/2013 Document Reviewed: 02/11/2016  [employer] Interactive Patient Education ©2016 [employer]        Hyponatremia  Hyponatremia is when the amount of salt (sodium) in your blood is too low. When sodium levels are low, your cells absorb extra water and they swell. The swelling happens throughout the body, but it mostly affects the brain.  CAUSES  This condition may be caused by:  · Heart, kidney, or liver problems.  · Thyroid problems.  · Adrenal gland problems.  · Metabolic conditions, such as syndrome of inappropriate antidiuretic hormone (SIADH).  · Severe vomiting and diarrhea.  · Certain medicines or illegal drugs.  · Dehydration.  · Drinking too much water.  · Eating a diet that is low in  sodium.  · Large burns on your body.  · Sweating.  RISK FACTORS  This condition is more likely to develop in people who:  · Have long-term (chronic) kidney disease.  · Have heart failure.  · Have a medical condition that causes frequent or excessive diarrhea.  · Have metabolic conditions, such as Wells disease or SIADH.  · Take certain medicines that affect the sodium and fluid balance in the blood. Some of these medicine types include:  ¨ Diuretics.  ¨ NSAIDs.  ¨ Some opioid pain medicines.  ¨ Some antidepressants.  ¨ Some seizure prevention medicines.  SYMPTOMS   Symptoms of this condition include:  · Nausea and vomiting.  · Confusion.  · Lethargy.  · Agitation.  · Headache.  · Seizures.  · Unconsciousness.  · Appetite loss.  · Muscle weakness and cramping.  · Feeling weak or light-headed.  · Having a rapid heart rate.  · Fainting, in severe cases.  DIAGNOSIS  This condition is diagnosed with a medical history and physical exam. You will also have other tests, including:  · Blood tests.  · Urine tests.  TREATMENT  Treatment for this condition depends on the cause. Treatment may include:  · Fluids given through an IV tube that is inserted into one of your veins.  · Medicines to correct the sodium imbalance. If medicines are causing the condition, the medicines will need to be adjusted.  · Limiting water or fluid intake to get the correct sodium balance.  HOME CARE INSTRUCTIONS  · Take medicines only as directed by your health care provider. Many medicines can make this condition worse. Talk with your health care provider about any medicines that you are currently taking.  · Carefully follow a recommended diet as directed by your health care provider.  · Carefully follow instructions from your health care provider about fluid restrictions.  · Keep all follow-up visits as directed by your health care provider. This is important.  · Do not drink alcohol.  SEEK MEDICAL CARE IF:  · You develop worsening nausea,  fatigue, headache, confusion, or weakness.  · Your symptoms go away and then return.  · You have problems following the recommended diet.  SEEK IMMEDIATE MEDICAL CARE IF:  · You have a seizure.  · You faint.  · You have ongoing diarrhea or vomiting.     This information is not intended to replace advice given to you by your health care provider. Make sure you discuss any questions you have with your health care provider.     Document Released: 12/08/2003 Document Revised: 05/03/2016 Document Reviewed: 01/07/2016  [employer] Interactive Patient Education ©2016 [employer]      Please call MD or return to ED for bowel or bladder incontinence, difficulty walking, loss of sensation/movement in arms/legs or other concerns.

## 2017-02-02 NOTE — PROGRESS NOTES
IV dc'd.  Discharge instructions given to patient; patient verbalizes understanding, all questions answered.  Copy of DC summary provided, signed copy in chart.  2 prescriptions provided to patient, copies in chart.  Pt states personal belongings are in possession.  Pt escorted off unit by this nurse without incident.

## 2017-02-07 ENCOUNTER — HOSPITAL ENCOUNTER (EMERGENCY)
Facility: MEDICAL CENTER | Age: 47
End: 2017-02-07
Payer: COMMERCIAL

## 2017-02-07 ENCOUNTER — HOSPITAL ENCOUNTER (EMERGENCY)
Facility: MEDICAL CENTER | Age: 47
End: 2017-02-07
Attending: EMERGENCY MEDICINE
Payer: COMMERCIAL

## 2017-02-07 VITALS
OXYGEN SATURATION: 91 % | HEART RATE: 85 BPM | BODY MASS INDEX: 24.5 KG/M2 | HEIGHT: 65 IN | SYSTOLIC BLOOD PRESSURE: 157 MMHG | TEMPERATURE: 99 F | DIASTOLIC BLOOD PRESSURE: 110 MMHG | RESPIRATION RATE: 16 BRPM | WEIGHT: 147.05 LBS

## 2017-02-07 VITALS
DIASTOLIC BLOOD PRESSURE: 105 MMHG | WEIGHT: 145 LBS | RESPIRATION RATE: 18 BRPM | TEMPERATURE: 97.3 F | SYSTOLIC BLOOD PRESSURE: 130 MMHG | BODY MASS INDEX: 24.13 KG/M2 | OXYGEN SATURATION: 100 % | HEART RATE: 76 BPM

## 2017-02-07 DIAGNOSIS — R19.7 NAUSEA VOMITING AND DIARRHEA: ICD-10-CM

## 2017-02-07 DIAGNOSIS — R11.2 NAUSEA VOMITING AND DIARRHEA: ICD-10-CM

## 2017-02-07 LAB
ALBUMIN SERPL BCP-MCNC: 4.4 G/DL (ref 3.2–4.9)
ALBUMIN/GLOB SERPL: 1.4 G/DL
ALP SERPL-CCNC: 38 U/L (ref 30–99)
ALT SERPL-CCNC: 12 U/L (ref 2–50)
ANION GAP SERPL CALC-SCNC: 12 MMOL/L (ref 0–11.9)
APPEARANCE UR: CLEAR
AST SERPL-CCNC: 21 U/L (ref 12–45)
BASOPHILS # BLD AUTO: 1.2 % (ref 0–1.8)
BASOPHILS # BLD: 0.09 K/UL (ref 0–0.12)
BILIRUB SERPL-MCNC: 1.3 MG/DL (ref 0.1–1.5)
BILIRUB UR QL CFM: NEGATIVE
BUN SERPL-MCNC: 11 MG/DL (ref 8–22)
CALCIUM SERPL-MCNC: 10.2 MG/DL (ref 8.4–10.2)
CHLORIDE SERPL-SCNC: 99 MMOL/L (ref 96–112)
CO2 SERPL-SCNC: 23 MMOL/L (ref 20–33)
COLOR UR: YELLOW
CREAT SERPL-MCNC: 0.68 MG/DL (ref 0.5–1.4)
CULTURE IF INDICATED INDCX: NO UA CULTURE
EOSINOPHIL # BLD AUTO: 0.01 K/UL (ref 0–0.51)
EOSINOPHIL NFR BLD: 0.1 % (ref 0–6.9)
ERYTHROCYTE [DISTWIDTH] IN BLOOD BY AUTOMATED COUNT: 40.1 FL (ref 35.9–50)
GFR SERPL CREATININE-BSD FRML MDRD: >60 ML/MIN/1.73 M 2
GLOBULIN SER CALC-MCNC: 3.2 G/DL (ref 1.9–3.5)
GLUCOSE SERPL-MCNC: 106 MG/DL (ref 65–99)
GLUCOSE UR STRIP.AUTO-MCNC: NEGATIVE MG/DL
HCT VFR BLD AUTO: 38.7 % (ref 37–47)
HGB BLD-MCNC: 14.4 G/DL (ref 12–16)
IMM GRANULOCYTES # BLD AUTO: 0.03 K/UL (ref 0–0.11)
IMM GRANULOCYTES NFR BLD AUTO: 0.4 % (ref 0–0.9)
KETONES UR STRIP.AUTO-MCNC: 15 MG/DL
LEUKOCYTE ESTERASE UR QL STRIP.AUTO: NEGATIVE
LIPASE SERPL-CCNC: 36 U/L (ref 7–58)
LYMPHOCYTES # BLD AUTO: 1.3 K/UL (ref 1–4.8)
LYMPHOCYTES NFR BLD: 17.7 % (ref 22–41)
MCH RBC QN AUTO: 35 PG (ref 27–33)
MCHC RBC AUTO-ENTMCNC: 37.2 G/DL (ref 33.6–35)
MCV RBC AUTO: 94.2 FL (ref 81.4–97.8)
MICRO URNS: ABNORMAL
MONOCYTES # BLD AUTO: 0.5 K/UL (ref 0–0.85)
MONOCYTES NFR BLD AUTO: 6.8 % (ref 0–13.4)
NEUTROPHILS # BLD AUTO: 5.41 K/UL (ref 2–7.15)
NEUTROPHILS NFR BLD: 73.8 % (ref 44–72)
NITRITE UR QL STRIP.AUTO: NEGATIVE
NRBC # BLD AUTO: 0 K/UL
NRBC BLD AUTO-RTO: 0 /100 WBC
PH UR STRIP.AUTO: 8.5 [PH]
PLATELET # BLD AUTO: 327 K/UL (ref 164–446)
PMV BLD AUTO: 8 FL (ref 9–12.9)
POTASSIUM SERPL-SCNC: 3.9 MMOL/L (ref 3.6–5.5)
PROT SERPL-MCNC: 7.6 G/DL (ref 6–8.2)
PROT UR QL STRIP: NEGATIVE MG/DL
RBC # BLD AUTO: 4.11 M/UL (ref 4.2–5.4)
RBC UR QL AUTO: NEGATIVE
SODIUM SERPL-SCNC: 134 MMOL/L (ref 135–145)
SP GR UR STRIP.AUTO: 1.01
WBC # BLD AUTO: 7.3 K/UL (ref 4.8–10.8)

## 2017-02-07 PROCEDURE — 302449 STATCHG TRIAGE ONLY (STATISTIC)

## 2017-02-07 PROCEDURE — 96374 THER/PROPH/DIAG INJ IV PUSH: CPT

## 2017-02-07 PROCEDURE — 85025 COMPLETE CBC W/AUTO DIFF WBC: CPT

## 2017-02-07 PROCEDURE — 99285 EMERGENCY DEPT VISIT HI MDM: CPT

## 2017-02-07 PROCEDURE — 96361 HYDRATE IV INFUSION ADD-ON: CPT

## 2017-02-07 PROCEDURE — 80053 COMPREHEN METABOLIC PANEL: CPT

## 2017-02-07 PROCEDURE — 700105 HCHG RX REV CODE 258: Performed by: EMERGENCY MEDICINE

## 2017-02-07 PROCEDURE — 81003 URINALYSIS AUTO W/O SCOPE: CPT

## 2017-02-07 PROCEDURE — 36415 COLL VENOUS BLD VENIPUNCTURE: CPT

## 2017-02-07 PROCEDURE — 700111 HCHG RX REV CODE 636 W/ 250 OVERRIDE (IP): Performed by: EMERGENCY MEDICINE

## 2017-02-07 PROCEDURE — 83690 ASSAY OF LIPASE: CPT

## 2017-02-07 PROCEDURE — 96375 TX/PRO/DX INJ NEW DRUG ADDON: CPT

## 2017-02-07 RX ORDER — SODIUM CHLORIDE 9 MG/ML
1000 INJECTION, SOLUTION INTRAVENOUS ONCE
Status: COMPLETED | OUTPATIENT
Start: 2017-02-07 | End: 2017-02-07

## 2017-02-07 RX ORDER — ONDANSETRON 4 MG/1
4 TABLET, ORALLY DISINTEGRATING ORAL EVERY 8 HOURS PRN
Qty: 10 TAB | Refills: 0 | Status: SHIPPED | OUTPATIENT
Start: 2017-02-07 | End: 2017-11-03

## 2017-02-07 RX ORDER — ONDANSETRON 2 MG/ML
4 INJECTION INTRAMUSCULAR; INTRAVENOUS ONCE
Status: COMPLETED | OUTPATIENT
Start: 2017-02-07 | End: 2017-02-07

## 2017-02-07 RX ORDER — MORPHINE SULFATE 4 MG/ML
4 INJECTION, SOLUTION INTRAMUSCULAR; INTRAVENOUS ONCE
Status: COMPLETED | OUTPATIENT
Start: 2017-02-07 | End: 2017-02-07

## 2017-02-07 RX ADMIN — SODIUM CHLORIDE 1000 ML: 9 INJECTION, SOLUTION INTRAVENOUS at 02:34

## 2017-02-07 RX ADMIN — MORPHINE SULFATE 4 MG: 4 INJECTION INTRAVENOUS at 02:37

## 2017-02-07 RX ADMIN — ONDANSETRON 4 MG: 2 INJECTION, SOLUTION INTRAMUSCULAR; INTRAVENOUS at 02:37

## 2017-02-07 ASSESSMENT — PAIN SCALES - GENERAL: PAINLEVEL_OUTOF10: 0

## 2017-02-07 NOTE — ED NOTES
"Pt here with c/o    Chief Complaint   Patient presents with   • Abdominal Pain     since 7 am   • N/V       /110 mmHg  Pulse 74  Temp(Src) 37.2 °C (99 °F)  Resp 16  Ht 1.651 m (5' 5\")  Wt 66.7 kg (147 lb 0.8 oz)  BMI 24.47 kg/m2  SpO2 100%  LMP  (Approximate)    "

## 2017-02-07 NOTE — ED AVS SNAPSHOT
2/7/2017          Justina Bedient  [address] [place] [address]    Dear Justina:    [facility] wants to ensure your discharge home is safe and you or your loved ones have had all your questions answered regarding your care after you leave the hospital.    You may receive a telephone call within two days of your discharge.  This call is to make certain you understand your discharge instructions as well as ensure we provided you with the best care possible during your stay with us.     The call will only last approximately 3-5 minutes and will be done by a nurse.    Once again, we want to ensure your discharge home is safe and that you have a clear understanding of any next steps in your care.  If you have any questions or concerns, please do not hesitate to contact us, we are here for you.  Thank you for choosing [facility] for your healthcare needs.    Sincerely,    Cade Collins    [facility]

## 2017-02-07 NOTE — ED NOTES
"Chief Complaint   Patient presents with   • Abdominal Pain     Reports symptoms started at 7am yesterday. Mid abd pain that radiates to side and lower back   • Nausea/Vomiting/Diarrhea     /105 mmHg  Pulse 76  Temp(Src) 36.3 °C (97.3 °F)  Resp 18  Wt 65.772 kg (145 lb)  SpO2 100%  LMP  (Approximate)    When pt was told about possible wait time, pt states \"I can't wait that long I'm going to [facility]\" instructed of possible risks. Pt left AMA with family.  "

## 2017-02-07 NOTE — ED PROVIDER NOTES
"ED Provider Note    CHIEF COMPLAINT  Chief Complaint   Patient presents with   • Abdominal Pain       HPI  Justina Melo is a 46 y.o. female who presents to the emergency department chief complaints bilateral lower abdominal discomfort, diarrhea, nausea vomiting and now dry heaving. Patient states she woke up yesterday morning at 7 AM with some abdominal discomfort that felt sharp and relapsing remitting, she took some meloxicam that she had been prescribed for her back pain and states that it did take the edge off the discomfort and then throughout the day she began to feel worse having multiple bouts of diarrhea multiple bouts of emesis and dry heaving. She states that the pain is now 7 out of 10 in general in the lower abdomen radiating to her lower back. Denies any blood in her emesis or stool, she does not know if she had any fevers at home denies dysuria. She has not taken anything else for pain since yesterday morning    REVIEW OF SYSTEMS  See HPI for further details. All other systems are negative.     PAST MEDICAL HISTORY   has a past medical history of Hypertension.    SOCIAL HISTORY  Social History     Social History Main Topics   • Smoking status: Never Smoker    • Smokeless tobacco: Not on file   • Alcohol Use: Yes      Comment: moderate drinker. drink per day   • Drug Use: No   • Sexual Activity: Not on file       SURGICAL HISTORY   has past surgical history that includes other orthopedic surgery and gyn surgery.    CURRENT MEDICATIONS  Home Medications     **Home medications have not yet been reviewed for this encounter**          ALLERGIES  No Known Allergies    PHYSICAL EXAM  VITAL SIGNS: /110 mmHg  Pulse 74  Temp(Src) 37.2 °C (99 °F)  Resp 16  Ht 1.651 m (5' 5\")  Wt 66.7 kg (147 lb 0.8 oz)  BMI 24.47 kg/m2  SpO2 100%  LMP  (Approximate)   Pulse ox interpretation: I interpret this pulse ox as normal.  Constitutional: Alert in no apparent distress.  HENT: No signs of trauma, Bilateral " external ears normal, Nose normal. MMM  Eyes: Pupils are equal and reactive, Conjunctiva normal, Non-icteric.   Neck: Normal range of motion, No tenderness, Supple, No stridor.   Lymphatic: No lymphadenopathy noted.   Cardiovascular: Regular rate and rhythm, no murmurs. dp's distally 2+  Thorax & Lungs: Normal breath sounds, No respiratory distress, No wheezing, No chest tenderness.   Abdomen: hyperactive sounds normal, Soft, No tenderness, No masses, No pulsatile masses. No peritoneal signs.  Skin: Warm, Dry, No erythema, No rash.   Back: No bony tenderness, No CVA tenderness.   Extremities: Intact distal pulses, No edema, No tenderness, No cyanosis,  Negative Keli's sign.   Musculoskeletal: Good range of motion in all major joints. No tenderness to palpation or major deformities noted.   Neurologic: Alert , Normal motor function, Normal sensory function, No focal deficits noted.   Psychiatric: Affect normal, Judgment normal, Mood normal.       DIFFERENTIAL DIAGNOSIS AND WORK UP PLAN    This is a 46 y.o. female who presents with generalized abdominal pain and lower quadrants nausea vomiting and diarrhea without blood in her emesis or stool. She is not tachycardic she has moist mucous membranes her abdomen is soft with hyperactive bowel sounds. Her differential diagnosis includes neurovirus infection versus other viral gastroenteritis versus urinary tract infection versus pyelonephritis versus colitis unlikely appendicitis or cholecystitis or bowel obstruction or any other surgical emergency. I do not feel an abdominal pulsatile mass and she has normal DP pulses unlikely an aortic pathology. Patient will receive IV fluid pain management anti-emetics and laboratory analysis and reassessment    DIAGNOSTIC STUDIES / PROCEDURES    LABS  Pertinent Lab Findings  CBC with a mild left shift otherwise unchanged from prior, CMP with sodium 134 otherwise within normal limits and urinalysis with only mild ketones without  "evidence of DKA or other severe dehydration or electrolyte abnormality    COURSE & MEDICAL DECISION MAKING  Pertinent Labs & Imaging studies reviewed. (See chart for details)    3:10 AM  Reassessed the patient, she states she is feeling much better her nausea was completely resolved and her pain is gone. Discussed with the patient that she may have a normal iris infection as they're currently rampant in the [facility] at this time - discussed with the patient she needs to rest and only drink and eat light things over the next 48 hours including plenty of liquids. She'll be sent home with Phenergan prescription for ODT Zofran and strict return precautions which include worsening or more localized pain, with fevers decreased stool output and increased vomiting or inability to tolerate by mouth's. She understands that she had her  feel comfortable going home.    /110 mmHg  Pulse 85  Temp(Src) 37.2 °C (99 °F)  Resp 16  Ht 1.651 m (5' 5\")  Wt 66.7 kg (147 lb 0.8 oz)  BMI 24.47 kg/m2  SpO2 91%  LMP  (Approximate)  \  The patient will return for worsening symptoms and is stable at the time of discharge. The patient verbalizes understanding and will comply.    FOLLOW UP    Preeti Medina M.D.  [address]  [address]  [facility] [address]  [phone]    Schedule an appointment as soon as possible for a visit      [facility], Emergency Dept  [address]  [facility] [id number] [phone]    If symptoms worsen - blood in stool, difficulty staying hydrated        FINAL IMPRESSION  1. Nausea vomiting and diarrhea          Electronically signed by: Amy Be, 2/7/2017 2:04 AM    This dictation has been created using voice recognition software and/or scribes. The accuracy of the dictation is limited by the abilities of the software and the expertise of the scribes. I expect there may be some errors of grammar and possibly content. I made every attempt to manually correct the " errors within my dictation. However, errors related to voice recognition software and/or scribes may still exist and should be interpreted within the appropriate context.

## 2017-02-07 NOTE — ED AVS SNAPSHOT
Home Care Instructions                                                                                                                Justina Melo   MRN: [id number]    Department:  [facility], Emergency Dept   Date of Visit:  2/7/2017            [facility], Emergency Dept    [address]    [place] [address]    Phone:  [phone]      You were seen by     Amy Be M.D.      Your Diagnosis Was     Nausea vomiting and diarrhea     R11.2, R19.7       These are the medications you received during your hospitalization from 02/07/2017 0150 to 02/07/2017 0316     Date/Time Order Dose Route Action    02/07/2017 0234 NS infusion 1,000 mL 1,000 mL Intravenous New Bag    02/07/2017 0237 morphine (pf) 4 mg/ml injection 4 mg 4 mg Intravenous Given    02/07/2017 0237 ondansetron (ZOFRAN) syringe/vial injection 4 mg 4 mg Intravenous Given      Follow-up Information     1. Schedule an appointment as soon as possible for a visit with Preeti Medina M.D..    Specialty:  Family Medicine    Contact information    [address]  [address]  [place] [address]  [phone]          2. Follow up with [facility], Emergency Dept.    Specialty:  Emergency Medicine    Why:  If symptoms worsen - blood in stool, difficulty staying hydrated    Contact information    [address] [id number] [phone]      Medication Information     Review all of your home medications and newly ordered medications with your primary doctor and/or pharmacist as soon as possible. Follow medication instructions as directed by your doctor and/or pharmacist.     Please keep your complete medication list with you and share with your physician. Update the information when medications are discontinued, doses are changed, or new medications (including over-the-counter products) are added; and carry medication information at all times in the event of emergency  situations.               Medication List      START taking these medications        Instructions    ondansetron 4 MG Tbdp   Commonly known as:  ZOFRAN ODT    Take 1 Tab by mouth every 8 hours as needed for Nausea/Vomiting.   Dose:  4 mg         ASK your doctor about these medications        Instructions    B-12 PO    Take 2 Tabs by mouth every day.   Dose:  2 Tab       GARCINIA CAMBOGIA-CHROMIUM PO    Take 2 Tabs by mouth every day.   Dose:  2 Tab       GREEN COFFEE BEAN PO    Take 1 Cap by mouth every day.   Dose:  1 Cap       lisinopril 20 MG Tabs   Commonly known as:  PRINIVIL    Take 20 mg by mouth every day.    Indications: High Blood Pressure   Dose:  20 mg       meloxicam 7.5 MG Tabs   Commonly known as:  MOBIC    Take 1 Tab by mouth 2 times daily with meals as needed for Moderate Pain or Severe Pain.   Dose:  7.5 mg       SUDAFED 24 HOUR PO    Take 1 Tab by mouth every day.   Dose:  1 Tab               Procedures and tests performed during your visit     CBC WITH DIFFERENTIAL    COMP METABOLIC PANEL    ESTIMATED GFR    IV SALINE LOCK    LIPASE    UR BILI ICTOTEST    URINALYSIS CULTURE, IF INDICATED        Discharge Instructions       Norovirus Infection  A norovirus infection is caused by exposure to a virus in a group of similar viruses (noroviruses). This type of infection causes inflammation in your stomach and intestines (gastroenteritis). Norovirus is the most common cause of gastroenteritis. It also causes food poisoning.  Anyone can get a norovirus infection. It spreads very easily (contagious). You can get it from contaminated food, water, surfaces, or other people. Norovirus is found in the stool or vomit of infected people. You can spread the infection as soon as you feel sick until 2 weeks after you recover.   Symptoms usually begin within 2 days after you become infected. Most norovirus symptoms affect the digestive system.  CAUSES  Norovirus infection is caused by contact with norovirus. You  can catch norovirus if you:  · Eat or drink something contaminated with norovirus.  · Touch surfaces or objects contaminated with norovirus and then put your hand in your mouth.  · Have direct contact with an infected person who has symptoms.  · Share food, drink, or utensils with someone with who is sick with norovirus.  SIGNS AND SYMPTOMS  Symptoms of norovirus may include:  · Nausea.  · Vomiting.  · Diarrhea.  · Stomach cramps.  · Fever.  · Chills.  · Headache.  · Muscle aches.  · Tiredness.  DIAGNOSIS  Your health care provider may suspect norovirus based on your symptoms and physical exam. Your health care provider may also test a sample of your stool or vomit for the virus.   TREATMENT  There is no specific treatment for norovirus. Most people get better without treatment in about 2 days.  HOME CARE INSTRUCTIONS  · Replace lost fluids by drinking plenty of water or rehydration fluids containing important minerals called electrolytes. This prevents dehydration. Drink enough fluid to keep your urine clear or pale yellow.  · Do not prepare food for others while you are infected. Wait at least 3 days after recovering from the illness to do that.  PREVENTION   · Wash your hands often, especially after using the toilet or changing a diaper.  · Wash fruits and vegetables thoroughly before preparing or serving them.  · Throw out any food that a sick person may have touched.  · Disinfect contaminated surfaces immediately after someone in the household has been sick. Use a bleach-based household .  · Immediately remove and wash soiled clothes or sheets.  SEEK MEDICAL CARE IF:  · Your vomiting, diarrhea, and stomach pain is getting worse.  · Your symptoms of norovirus do not go away after 2-3 days.  SEEK IMMEDIATE MEDICAL CARE IF:   You develop symptoms of dehydration that do not improve with fluid replacement. This may include:  · Excessive sleepiness.  · Lack of tears.  · Dry mouth.  · Dizziness when  standing.  · Weak pulse.     This information is not intended to replace advice given to you by your health care provider. Make sure you discuss any questions you have with your health care provider.     Document Released: 03/09/2004 Document Revised: 01/08/2016 Document Reviewed: 05/28/2015  [employer] Interactive Patient Education ©2016 [employer]            Patient Information     Patient Information    Following emergency treatment: all patient requiring follow-up care must return either to a private physician or a clinic if your condition worsens before you are able to obtain further medical attention, please return to the emergency room.     Billing Information    At [facility], we work to make the billing process streamlined for our patients.  Our Representatives are here to answer any questions you may have regarding your hospital bill.  If you have insurance coverage and have supplied your insurance information to us, we will submit a claim to your insurer on your behalf.  Should you have any questions regarding your bill, we can be reached online or by phone as follows:  Online: You are able pay your bills online or live chat with our representatives about any billing questions you may have. We are here to help Monday - Friday from 8:00am to 7:30pm and 9:00am - 12:00pm on Saturdays.  Please visit [email or web address]  for more information.   Phone:  [phone] or [phone]    Please note that your emergency physician, surgeon, pathologist, radiologist, anesthesiologist, and other specialists are not employed by [facility] and will therefore bill separately for their services.  Please contact them directly for any questions concerning their bills at the numbers below:     Emergency Physician Services:  [phone]  [facility]:  [phone]  [facility]:  [phone]  [facility]:  [phone]    1. Your final bill  may vary from the amount quoted upon discharge if all procedures are not complete at that time, or if your doctor has additional procedures of which we are not aware. You will receive an additional bill if you return to the Emergency Department at [facility] for suture removal regardless of the facility of which the sutures were placed.     2. Please arrange for settlement of this account at the emergency registration.    3. All self-pay accounts are due in full at the time of treatment.  If you are unable to meet this obligation then payment is expected within 4-5 days.     4. If you have had radiology studies (CT, X-ray, Ultrasound, MRI), you have received a preliminary result during your emergency department visit. Please contact the radiology department [phone] to receive a copy of your final result. Please discuss the Final result with your primary physician or with the follow up physician provided.     Crisis Hotline:  [place] Crisis Hotline:  5-482-OHSXVCF or 1-974.899.9555  [place] Crisis Hotline:    1-757.424.1300 or 541-561-1841         ED Discharge Follow Up Questions    1. In order to provide you with very good care, we would like to follow up with a phone call in the next few days.  May we have your permission to contact you?     YES /  NO    2. What is the best phone number to call you? (       )_____-__________    3. What is the best time to call you?      Morning  /  Afternoon  /  Evening                   Patient Signature:  ____________________________________________________________    Date:  ____________________________________________________________      Your appointments     Mar 10, 2017  8:40 AM   New Patient with Jesse Andrade M.D.   [facility] (--)    [address], [address]  [facility] [address] [phone]           Please bring Photo ID, Insurance Cards, All Medication Bottles and copies of any legal documents (such as Living Will, Power of ) If  speaking a language besides English please bring an adult . Please arrive 30 minutes prior for check in and registration. You will be receiving a confirmation call a few days before your appointment from our automated call confirmation system.

## 2017-02-07 NOTE — DISCHARGE INSTRUCTIONS
Norovirus Infection  A norovirus infection is caused by exposure to a virus in a group of similar viruses (noroviruses). This type of infection causes inflammation in your stomach and intestines (gastroenteritis). Norovirus is the most common cause of gastroenteritis. It also causes food poisoning.  Anyone can get a norovirus infection. It spreads very easily (contagious). You can get it from contaminated food, water, surfaces, or other people. Norovirus is found in the stool or vomit of infected people. You can spread the infection as soon as you feel sick until 2 weeks after you recover.   Symptoms usually begin within 2 days after you become infected. Most norovirus symptoms affect the digestive system.  CAUSES  Norovirus infection is caused by contact with norovirus. You can catch norovirus if you:  · Eat or drink something contaminated with norovirus.  · Touch surfaces or objects contaminated with norovirus and then put your hand in your mouth.  · Have direct contact with an infected person who has symptoms.  · Share food, drink, or utensils with someone with who is sick with norovirus.  SIGNS AND SYMPTOMS  Symptoms of norovirus may include:  · Nausea.  · Vomiting.  · Diarrhea.  · Stomach cramps.  · Fever.  · Chills.  · Headache.  · Muscle aches.  · Tiredness.  DIAGNOSIS  Your health care provider may suspect norovirus based on your symptoms and physical exam. Your health care provider may also test a sample of your stool or vomit for the virus.   TREATMENT  There is no specific treatment for norovirus. Most people get better without treatment in about 2 days.  HOME CARE INSTRUCTIONS  · Replace lost fluids by drinking plenty of water or rehydration fluids containing important minerals called electrolytes. This prevents dehydration. Drink enough fluid to keep your urine clear or pale yellow.  · Do not prepare food for others while you are infected. Wait at least 3 days after recovering from the illness to do  that.  PREVENTION   · Wash your hands often, especially after using the toilet or changing a diaper.  · Wash fruits and vegetables thoroughly before preparing or serving them.  · Throw out any food that a sick person may have touched.  · Disinfect contaminated surfaces immediately after someone in the household has been sick. Use a bleach-based household .  · Immediately remove and wash soiled clothes or sheets.  SEEK MEDICAL CARE IF:  · Your vomiting, diarrhea, and stomach pain is getting worse.  · Your symptoms of norovirus do not go away after 2-3 days.  SEEK IMMEDIATE MEDICAL CARE IF:   You develop symptoms of dehydration that do not improve with fluid replacement. This may include:  · Excessive sleepiness.  · Lack of tears.  · Dry mouth.  · Dizziness when standing.  · Weak pulse.     This information is not intended to replace advice given to you by your health care provider. Make sure you discuss any questions you have with your health care provider.     Document Released: 03/09/2004 Document Revised: 01/08/2016 Document Reviewed: 05/28/2015  [employer] Interactive Patient Education ©2016 [employer]

## 2017-02-07 NOTE — ED NOTES
Pt discharged with instructions and script.  Pt verbalized understanding of discharge instructions.  Pt ambulated out of ED

## 2017-02-07 NOTE — ED AVS SNAPSHOT
Information Assurance Access Code: Activation code not generated  Current Information Assurance Status: Active    [employer]  A secure, online tool to manage your health information     The [employer] Information Assurance® is a secure, online tool that connects you to your personalized health information from the privacy of your home -- day or night - making it very easy for you to manage your healthcare. Once the activation process is completed, you can even access your medical information using the Information Assurance anneliese, which is available for free in the Apple Anneliese store or Google Play store.     Information Assurance provides the following levels of access (as shown below):   My Chart Features   [facility] Primary Care Doctor [facility]  Specialists [facility]  Urgent  Care Non-[facility]  Primary Care  Doctor   Email your healthcare team securely and privately 24/7 X X X X   Manage appointments: schedule your next appointment; view details of past/upcoming appointments X      Request prescription refills. X      View recent personal medical records, including lab and immunizations X X X X   View health record, including health history, allergies, medications X X X X   Read reports about your outpatient visits, procedures, consult and ER notes X X X X   See your discharge summary, which is a recap of your hospital and/or ER visit that includes your diagnosis, lab results, and care plan. X X       How to register for Information Assurance:  1. Go to  [email or web address].  2. Click on the Sign Up Now box, which takes you to the New Member Sign Up page. You will need to provide the following information:  a. Enter your Information Assurance Access Code exactly as it appears at the top of this page. (You will not need to use this code after you’ve completed the sign-up process. If you do not sign up before the expiration date, you must request a new code.)   b. Enter your date of birth.   c. Enter your home email address.   d. Click Submit, and follow the next screen’s instructions.  3. Create a Information Assurance ID. This will  be your [employer] login ID and cannot be changed, so think of one that is secure and easy to remember.  4. Create a [employer] password. You can change your password at any time.  5. Enter your Password Reset Question and Answer. This can be used at a later time if you forget your password.   6. Enter your e-mail address. This allows you to receive e-mail notifications when new information is available in [employer].  7. Click Sign Up. You can now view your health information.    For assistance activating your [employer] account, call [phone]

## 2017-11-03 ENCOUNTER — HOSPITAL ENCOUNTER (OUTPATIENT)
Dept: RADIOLOGY | Facility: MEDICAL CENTER | Age: 47
DRG: 455 | End: 2017-11-03
Attending: NEUROLOGICAL SURGERY | Admitting: NEUROLOGICAL SURGERY
Payer: COMMERCIAL

## 2017-11-03 DIAGNOSIS — Z01.811 PRE-OPERATIVE RESPIRATORY EXAMINATION: ICD-10-CM

## 2017-11-03 DIAGNOSIS — Z01.812 PRE-PROCEDURAL LABORATORY EXAMINATION: ICD-10-CM

## 2017-11-03 DIAGNOSIS — Z01.810 PRE-OPERATIVE CARDIOVASCULAR EXAMINATION: ICD-10-CM

## 2017-11-03 LAB
ANION GAP SERPL CALC-SCNC: 12 MMOL/L (ref 0–11.9)
APPEARANCE UR: CLEAR
APTT PPP: 28.5 SEC (ref 24.7–36)
BACTERIA #/AREA URNS HPF: NEGATIVE /HPF
BASOPHILS # BLD AUTO: 1.9 % (ref 0–1.8)
BASOPHILS # BLD: 0.11 K/UL (ref 0–0.12)
BILIRUB UR QL STRIP.AUTO: NEGATIVE
BUN SERPL-MCNC: 15 MG/DL (ref 8–22)
CALCIUM SERPL-MCNC: 10.1 MG/DL (ref 8.5–10.5)
CHLORIDE SERPL-SCNC: 91 MMOL/L (ref 96–112)
CO2 SERPL-SCNC: 26 MMOL/L (ref 20–33)
COLOR UR: YELLOW
CREAT SERPL-MCNC: 1.02 MG/DL (ref 0.5–1.4)
CULTURE IF INDICATED INDCX: YES UA CULTURE
EKG IMPRESSION: NORMAL
EOSINOPHIL # BLD AUTO: 0.04 K/UL (ref 0–0.51)
EOSINOPHIL NFR BLD: 0.7 % (ref 0–6.9)
EPI CELLS #/AREA URNS HPF: ABNORMAL /HPF
ERYTHROCYTE [DISTWIDTH] IN BLOOD BY AUTOMATED COUNT: 40.3 FL (ref 35.9–50)
GFR SERPL CREATININE-BSD FRML MDRD: 58 ML/MIN/1.73 M 2
GLUCOSE SERPL-MCNC: 105 MG/DL (ref 65–99)
GLUCOSE UR STRIP.AUTO-MCNC: NEGATIVE MG/DL
HCT VFR BLD AUTO: 37.1 % (ref 37–47)
HGB BLD-MCNC: 13.6 G/DL (ref 12–16)
HYALINE CASTS #/AREA URNS LPF: ABNORMAL /LPF
IMM GRANULOCYTES # BLD AUTO: 0.02 K/UL (ref 0–0.11)
IMM GRANULOCYTES NFR BLD AUTO: 0.3 % (ref 0–0.9)
INR PPP: 1 (ref 0.87–1.13)
KETONES UR STRIP.AUTO-MCNC: 15 MG/DL
LEUKOCYTE ESTERASE UR QL STRIP.AUTO: ABNORMAL
LYMPHOCYTES # BLD AUTO: 1.02 K/UL (ref 1–4.8)
LYMPHOCYTES NFR BLD: 17.3 % (ref 22–41)
MCH RBC QN AUTO: 35 PG (ref 27–33)
MCHC RBC AUTO-ENTMCNC: 36.7 G/DL (ref 33.6–35)
MCV RBC AUTO: 95.4 FL (ref 81.4–97.8)
MICRO URNS: ABNORMAL
MONOCYTES # BLD AUTO: 0.3 K/UL (ref 0–0.85)
MONOCYTES NFR BLD AUTO: 5.1 % (ref 0–13.4)
NEUTROPHILS # BLD AUTO: 4.42 K/UL (ref 2–7.15)
NEUTROPHILS NFR BLD: 74.7 % (ref 44–72)
NITRITE UR QL STRIP.AUTO: NEGATIVE
NRBC # BLD AUTO: 0 K/UL
NRBC BLD AUTO-RTO: 0 /100 WBC
PH UR STRIP.AUTO: 5.5 [PH]
PLATELET # BLD AUTO: 230 K/UL (ref 164–446)
PMV BLD AUTO: 8.3 FL (ref 9–12.9)
POTASSIUM SERPL-SCNC: 3.2 MMOL/L (ref 3.6–5.5)
PROT UR QL STRIP: NEGATIVE MG/DL
PROTHROMBIN TIME: 12.9 SEC (ref 12–14.6)
RBC # BLD AUTO: 3.89 M/UL (ref 4.2–5.4)
RBC # URNS HPF: ABNORMAL /HPF
RBC UR QL AUTO: NEGATIVE
SODIUM SERPL-SCNC: 129 MMOL/L (ref 135–145)
SP GR UR STRIP.AUTO: 1.02
UROBILINOGEN UR STRIP.AUTO-MCNC: 0.2 MG/DL
WBC # BLD AUTO: 5.9 K/UL (ref 4.8–10.8)
WBC #/AREA URNS HPF: ABNORMAL /HPF

## 2017-11-03 PROCEDURE — 36415 COLL VENOUS BLD VENIPUNCTURE: CPT

## 2017-11-03 PROCEDURE — 81001 URINALYSIS AUTO W/SCOPE: CPT

## 2017-11-03 PROCEDURE — 85610 PROTHROMBIN TIME: CPT

## 2017-11-03 PROCEDURE — 85730 THROMBOPLASTIN TIME PARTIAL: CPT

## 2017-11-03 PROCEDURE — 80048 BASIC METABOLIC PNL TOTAL CA: CPT

## 2017-11-03 PROCEDURE — 87086 URINE CULTURE/COLONY COUNT: CPT

## 2017-11-03 PROCEDURE — 85025 COMPLETE CBC W/AUTO DIFF WBC: CPT

## 2017-11-03 PROCEDURE — 93005 ELECTROCARDIOGRAM TRACING: CPT

## 2017-11-03 PROCEDURE — 93010 ELECTROCARDIOGRAM REPORT: CPT | Performed by: INTERNAL MEDICINE

## 2017-11-03 PROCEDURE — 71020 DX-CHEST-2 VIEWS: CPT

## 2017-11-03 RX ORDER — IBUPROFEN 800 MG/1
800 TABLET ORAL 2 TIMES DAILY
Status: ON HOLD | COMMUNITY
End: 2017-11-12

## 2017-11-03 RX ORDER — LORATADINE 10 MG/1
10 TABLET ORAL DAILY
COMMUNITY

## 2017-11-03 RX ORDER — LISINOPRIL 20 MG/1
20 TABLET ORAL DAILY
COMMUNITY
End: 2022-12-28 | Stop reason: SDUPTHER

## 2017-11-05 LAB
BACTERIA UR CULT: NORMAL
SIGNIFICANT IND 70042: NORMAL
SITE SITE: NORMAL
SOURCE SOURCE: NORMAL

## 2017-11-06 ENCOUNTER — APPOINTMENT (OUTPATIENT)
Dept: ADMISSIONS | Facility: MEDICAL CENTER | Age: 47
DRG: 455 | End: 2017-11-06
Attending: NEUROLOGICAL SURGERY
Payer: COMMERCIAL

## 2017-11-06 DIAGNOSIS — Z01.812 PRE-OPERATIVE LABORATORY EXAMINATION: ICD-10-CM

## 2017-11-06 LAB
APPEARANCE UR: CLEAR
BILIRUB UR QL STRIP.AUTO: NEGATIVE
COLOR UR: YELLOW
CULTURE IF INDICATED INDCX: NO UA CULTURE
GLUCOSE UR STRIP.AUTO-MCNC: NEGATIVE MG/DL
KETONES UR STRIP.AUTO-MCNC: NEGATIVE MG/DL
LEUKOCYTE ESTERASE UR QL STRIP.AUTO: NEGATIVE
MICRO URNS: NORMAL
NITRITE UR QL STRIP.AUTO: NEGATIVE
PH UR STRIP.AUTO: 7 [PH]
PROT UR QL STRIP: NEGATIVE MG/DL
RBC UR QL AUTO: NEGATIVE
SP GR UR STRIP.AUTO: 1.01
UROBILINOGEN UR STRIP.AUTO-MCNC: 1 MG/DL

## 2017-11-06 PROCEDURE — 81003 URINALYSIS AUTO W/O SCOPE: CPT

## 2017-11-07 ENCOUNTER — APPOINTMENT (OUTPATIENT)
Dept: RADIOLOGY | Facility: MEDICAL CENTER | Age: 47
DRG: 455 | End: 2017-11-07
Attending: NEUROLOGICAL SURGERY
Payer: COMMERCIAL

## 2017-11-07 ENCOUNTER — HOSPITAL ENCOUNTER (INPATIENT)
Facility: MEDICAL CENTER | Age: 47
LOS: 5 days | DRG: 455 | End: 2017-11-12
Attending: NEUROLOGICAL SURGERY | Admitting: NEUROLOGICAL SURGERY
Payer: COMMERCIAL

## 2017-11-07 DIAGNOSIS — R20.2 INTERMITTENT TINGLING SENSATION OF LEFT HAND AND FOOT: ICD-10-CM

## 2017-11-07 DIAGNOSIS — M48.061 SPINAL STENOSIS OF LUMBAR REGION WITH RADICULOPATHY: ICD-10-CM

## 2017-11-07 DIAGNOSIS — M51.36 DEGENERATIVE DISC DISEASE, LUMBAR: ICD-10-CM

## 2017-11-07 DIAGNOSIS — M54.16 SPINAL STENOSIS OF LUMBAR REGION WITH RADICULOPATHY: ICD-10-CM

## 2017-11-07 PROCEDURE — A9270 NON-COVERED ITEM OR SERVICE: HCPCS | Performed by: PHYSICIAN ASSISTANT

## 2017-11-07 PROCEDURE — A9270 NON-COVERED ITEM OR SERVICE: HCPCS

## 2017-11-07 PROCEDURE — 160036 HCHG PACU - EA ADDL 30 MINS PHASE I: Performed by: NEUROLOGICAL SURGERY

## 2017-11-07 PROCEDURE — 4A11X4G MONITORING OF PERIPHERAL NERVOUS ELECTRICAL ACTIVITY, INTRAOPERATIVE, EXTERNAL APPROACH: ICD-10-PCS | Performed by: NEUROLOGICAL SURGERY

## 2017-11-07 PROCEDURE — 160009 HCHG ANES TIME/MIN: Performed by: NEUROLOGICAL SURGERY

## 2017-11-07 PROCEDURE — 700101 HCHG RX REV CODE 250

## 2017-11-07 PROCEDURE — 160002 HCHG RECOVERY MINUTES (STAT): Performed by: NEUROLOGICAL SURGERY

## 2017-11-07 PROCEDURE — 501838 HCHG SUTURE GENERAL: Performed by: NEUROLOGICAL SURGERY

## 2017-11-07 PROCEDURE — 700102 HCHG RX REV CODE 250 W/ 637 OVERRIDE(OP)

## 2017-11-07 PROCEDURE — 700102 HCHG RX REV CODE 250 W/ 637 OVERRIDE(OP): Performed by: PHYSICIAN ASSISTANT

## 2017-11-07 PROCEDURE — 160042 HCHG SURGERY MINUTES - EA ADDL 1 MIN LEVEL 5: Performed by: NEUROLOGICAL SURGERY

## 2017-11-07 PROCEDURE — 700112 HCHG RX REV CODE 229: Performed by: PHYSICIAN ASSISTANT

## 2017-11-07 PROCEDURE — 160031 HCHG SURGERY MINUTES - 1ST 30 MINS LEVEL 5: Performed by: NEUROLOGICAL SURGERY

## 2017-11-07 PROCEDURE — 770001 HCHG ROOM/CARE - MED/SURG/GYN PRIV*

## 2017-11-07 PROCEDURE — 0SB40ZZ EXCISION OF LUMBOSACRAL DISC, OPEN APPROACH: ICD-10-PCS | Performed by: NEUROLOGICAL SURGERY

## 2017-11-07 PROCEDURE — 700111 HCHG RX REV CODE 636 W/ 250 OVERRIDE (IP)

## 2017-11-07 PROCEDURE — 502626 HCHG SURGIFLO HEMOSTATIC MATRIX 6ML: Performed by: NEUROLOGICAL SURGERY

## 2017-11-07 PROCEDURE — 700111 HCHG RX REV CODE 636 W/ 250 OVERRIDE (IP): Performed by: PHYSICIAN ASSISTANT

## 2017-11-07 PROCEDURE — 160048 HCHG OR STATISTICAL LEVEL 1-5: Performed by: NEUROLOGICAL SURGERY

## 2017-11-07 PROCEDURE — 74000 DX-ABDOMEN-1 VIEW: CPT

## 2017-11-07 PROCEDURE — 0SG30A0 FUSION OF LUMBOSACRAL JOINT WITH INTERBODY FUSION DEVICE, ANTERIOR APPROACH, ANTERIOR COLUMN, OPEN APPROACH: ICD-10-PCS | Performed by: NEUROLOGICAL SURGERY

## 2017-11-07 PROCEDURE — 500698 HCHG HEMOCLIP, MEDIUM: Performed by: NEUROLOGICAL SURGERY

## 2017-11-07 PROCEDURE — A4314 CATH W/DRAINAGE 2-WAY LATEX: HCPCS | Performed by: NEUROLOGICAL SURGERY

## 2017-11-07 PROCEDURE — 95940 IONM IN OPERATNG ROOM 15 MIN: CPT | Performed by: NEUROLOGICAL SURGERY

## 2017-11-07 PROCEDURE — 160035 HCHG PACU - 1ST 60 MINS PHASE I: Performed by: NEUROLOGICAL SURGERY

## 2017-11-07 PROCEDURE — 700101 HCHG RX REV CODE 250: Performed by: PHYSICIAN ASSISTANT

## 2017-11-07 PROCEDURE — 500122 HCHG BOVIE, BLADE: Performed by: NEUROLOGICAL SURGERY

## 2017-11-07 PROCEDURE — 502000 HCHG MISC OR IMPLANTS RC 0278: Performed by: NEUROLOGICAL SURGERY

## 2017-11-07 PROCEDURE — 72100 X-RAY EXAM L-S SPINE 2/3 VWS: CPT

## 2017-11-07 PROCEDURE — 500697 HCHG HEMOCLIP, LARGE (ORANGE): Performed by: NEUROLOGICAL SURGERY

## 2017-11-07 RX ORDER — PROMETHAZINE HYDROCHLORIDE 25 MG/1
12.5-25 SUPPOSITORY RECTAL EVERY 4 HOURS PRN
Status: DISCONTINUED | OUTPATIENT
Start: 2017-11-07 | End: 2017-11-12 | Stop reason: HOSPADM

## 2017-11-07 RX ORDER — DIPHENHYDRAMINE HYDROCHLORIDE 50 MG/ML
25 INJECTION INTRAMUSCULAR; INTRAVENOUS EVERY 6 HOURS PRN
Status: DISCONTINUED | OUTPATIENT
Start: 2017-11-07 | End: 2017-11-12 | Stop reason: HOSPADM

## 2017-11-07 RX ORDER — LABETALOL HYDROCHLORIDE 5 MG/ML
10 INJECTION, SOLUTION INTRAVENOUS
Status: DISCONTINUED | OUTPATIENT
Start: 2017-11-07 | End: 2017-11-12 | Stop reason: HOSPADM

## 2017-11-07 RX ORDER — DEXTROSE MONOHYDRATE, SODIUM CHLORIDE, AND POTASSIUM CHLORIDE 50; 1.49; 4.5 G/1000ML; G/1000ML; G/1000ML
INJECTION, SOLUTION INTRAVENOUS CONTINUOUS
Status: DISCONTINUED | OUTPATIENT
Start: 2017-11-07 | End: 2017-11-12 | Stop reason: HOSPADM

## 2017-11-07 RX ORDER — CEFAZOLIN SODIUM 2 G/100ML
2 INJECTION, SOLUTION INTRAVENOUS EVERY 8 HOURS
Status: COMPLETED | OUTPATIENT
Start: 2017-11-07 | End: 2017-11-08

## 2017-11-07 RX ORDER — OXYCODONE HYDROCHLORIDE AND ACETAMINOPHEN 5; 325 MG/1; MG/1
1-2 TABLET ORAL EVERY 4 HOURS PRN
Status: DISCONTINUED | OUTPATIENT
Start: 2017-11-07 | End: 2017-11-12 | Stop reason: HOSPADM

## 2017-11-07 RX ORDER — LORATADINE 10 MG/1
10 TABLET ORAL DAILY
Status: DISCONTINUED | OUTPATIENT
Start: 2017-11-07 | End: 2017-11-12 | Stop reason: HOSPADM

## 2017-11-07 RX ORDER — SODIUM CHLORIDE, SODIUM LACTATE, POTASSIUM CHLORIDE, CALCIUM CHLORIDE 600; 310; 30; 20 MG/100ML; MG/100ML; MG/100ML; MG/100ML
INJECTION, SOLUTION INTRAVENOUS CONTINUOUS
Status: DISCONTINUED | OUTPATIENT
Start: 2017-11-07 | End: 2017-11-07

## 2017-11-07 RX ORDER — FLUTICASONE PROPIONATE 50 MCG
1 SPRAY, SUSPENSION (ML) NASAL DAILY
Status: DISCONTINUED | OUTPATIENT
Start: 2017-11-07 | End: 2017-11-12 | Stop reason: HOSPADM

## 2017-11-07 RX ORDER — CALCIUM CARBONATE 500 MG/1
500 TABLET, CHEWABLE ORAL 2 TIMES DAILY
Status: DISCONTINUED | OUTPATIENT
Start: 2017-11-07 | End: 2017-11-12 | Stop reason: HOSPADM

## 2017-11-07 RX ORDER — ONDANSETRON 4 MG/1
4 TABLET, ORALLY DISINTEGRATING ORAL EVERY 4 HOURS PRN
Status: DISCONTINUED | OUTPATIENT
Start: 2017-11-07 | End: 2017-11-12 | Stop reason: HOSPADM

## 2017-11-07 RX ORDER — CEFAZOLIN SODIUM 1 G/3ML
INJECTION, POWDER, FOR SOLUTION INTRAMUSCULAR; INTRAVENOUS
Status: DISCONTINUED | OUTPATIENT
Start: 2017-11-07 | End: 2017-11-07 | Stop reason: HOSPADM

## 2017-11-07 RX ORDER — LIDOCAINE HYDROCHLORIDE 10 MG/ML
0.5 INJECTION, SOLUTION INFILTRATION; PERINEURAL
Status: ACTIVE | OUTPATIENT
Start: 2017-11-07 | End: 2017-11-08

## 2017-11-07 RX ORDER — AMOXICILLIN 250 MG
1 CAPSULE ORAL NIGHTLY
Status: DISCONTINUED | OUTPATIENT
Start: 2017-11-07 | End: 2017-11-12 | Stop reason: HOSPADM

## 2017-11-07 RX ORDER — TRAMADOL HYDROCHLORIDE 50 MG/1
50 TABLET ORAL 2 TIMES DAILY PRN
COMMUNITY
End: 2023-05-25

## 2017-11-07 RX ORDER — LIDOCAINE HYDROCHLORIDE 10 MG/ML
INJECTION, SOLUTION INFILTRATION; PERINEURAL
Status: COMPLETED
Start: 2017-11-07 | End: 2017-11-07

## 2017-11-07 RX ORDER — METHOCARBAMOL 750 MG/1
750 TABLET, FILM COATED ORAL EVERY 8 HOURS PRN
Status: DISCONTINUED | OUTPATIENT
Start: 2017-11-07 | End: 2017-11-11

## 2017-11-07 RX ORDER — LIDOCAINE AND PRILOCAINE 25; 25 MG/G; MG/G
1 CREAM TOPICAL
Status: ACTIVE | OUTPATIENT
Start: 2017-11-07 | End: 2017-11-08

## 2017-11-07 RX ORDER — AMOXICILLIN 250 MG
1 CAPSULE ORAL
Status: DISCONTINUED | OUTPATIENT
Start: 2017-11-07 | End: 2017-11-12 | Stop reason: HOSPADM

## 2017-11-07 RX ORDER — LISINOPRIL 20 MG/1
20 TABLET ORAL DAILY
Status: DISCONTINUED | OUTPATIENT
Start: 2017-11-07 | End: 2017-11-12 | Stop reason: HOSPADM

## 2017-11-07 RX ORDER — POLYETHYLENE GLYCOL 3350 17 G/17G
1 POWDER, FOR SOLUTION ORAL 2 TIMES DAILY PRN
Status: DISCONTINUED | OUTPATIENT
Start: 2017-11-07 | End: 2017-11-12 | Stop reason: HOSPADM

## 2017-11-07 RX ORDER — HYDROCODONE BITARTRATE AND ACETAMINOPHEN 10; 325 MG/1; MG/1
1-2 TABLET ORAL EVERY 4 HOURS PRN
Status: DISCONTINUED | OUTPATIENT
Start: 2017-11-07 | End: 2017-11-12 | Stop reason: HOSPADM

## 2017-11-07 RX ORDER — ACETAMINOPHEN 500 MG
1000 TABLET ORAL 2 TIMES DAILY PRN
COMMUNITY

## 2017-11-07 RX ORDER — ONDANSETRON 2 MG/ML
4 INJECTION INTRAMUSCULAR; INTRAVENOUS EVERY 4 HOURS PRN
Status: DISCONTINUED | OUTPATIENT
Start: 2017-11-07 | End: 2017-11-12 | Stop reason: HOSPADM

## 2017-11-07 RX ORDER — ALPRAZOLAM 0.25 MG/1
0.25 TABLET ORAL 2 TIMES DAILY PRN
Status: DISCONTINUED | OUTPATIENT
Start: 2017-11-07 | End: 2017-11-12 | Stop reason: HOSPADM

## 2017-11-07 RX ORDER — DIPHENHYDRAMINE HCL 25 MG
25 TABLET ORAL EVERY 6 HOURS PRN
Status: DISCONTINUED | OUTPATIENT
Start: 2017-11-07 | End: 2017-11-12 | Stop reason: HOSPADM

## 2017-11-07 RX ORDER — PROMETHAZINE HYDROCHLORIDE 25 MG/1
12.5-25 TABLET ORAL EVERY 4 HOURS PRN
Status: DISCONTINUED | OUTPATIENT
Start: 2017-11-07 | End: 2017-11-12 | Stop reason: HOSPADM

## 2017-11-07 RX ORDER — OXYCODONE HCL 5 MG/5 ML
SOLUTION, ORAL ORAL
Status: COMPLETED
Start: 2017-11-07 | End: 2017-11-07

## 2017-11-07 RX ORDER — ENEMA 19; 7 G/133ML; G/133ML
1 ENEMA RECTAL
Status: DISCONTINUED | OUTPATIENT
Start: 2017-11-07 | End: 2017-11-12 | Stop reason: HOSPADM

## 2017-11-07 RX ORDER — HYDROMORPHONE HYDROCHLORIDE 2 MG/ML
0.5 INJECTION, SOLUTION INTRAMUSCULAR; INTRAVENOUS; SUBCUTANEOUS
Status: DISCONTINUED | OUTPATIENT
Start: 2017-11-07 | End: 2017-11-11

## 2017-11-07 RX ORDER — BISACODYL 10 MG
10 SUPPOSITORY, RECTAL RECTAL
Status: DISCONTINUED | OUTPATIENT
Start: 2017-11-07 | End: 2017-11-12 | Stop reason: HOSPADM

## 2017-11-07 RX ORDER — DOCUSATE SODIUM 100 MG/1
100 CAPSULE, LIQUID FILLED ORAL 2 TIMES DAILY
Status: DISCONTINUED | OUTPATIENT
Start: 2017-11-07 | End: 2017-11-12 | Stop reason: HOSPADM

## 2017-11-07 RX ADMIN — POTASSIUM CHLORIDE, DEXTROSE MONOHYDRATE AND SODIUM CHLORIDE: 150; 5; 450 INJECTION, SOLUTION INTRAVENOUS at 15:56

## 2017-11-07 RX ADMIN — DOCUSATE SODIUM 100 MG: 100 CAPSULE ORAL at 20:26

## 2017-11-07 RX ADMIN — CEFAZOLIN SODIUM 2 G: 2 INJECTION, SOLUTION INTRAVENOUS at 20:27

## 2017-11-07 RX ADMIN — LORATADINE 10 MG: 10 TABLET ORAL at 15:56

## 2017-11-07 RX ADMIN — FENTANYL CITRATE 25 MCG: 50 INJECTION, SOLUTION INTRAMUSCULAR; INTRAVENOUS at 14:35

## 2017-11-07 RX ADMIN — DIPHENHYDRAMINE HCL 25 MG: 25 TABLET ORAL at 14:15

## 2017-11-07 RX ADMIN — POTASSIUM CHLORIDE, DEXTROSE MONOHYDRATE AND SODIUM CHLORIDE: 150; 5; 450 INJECTION, SOLUTION INTRAVENOUS at 20:35

## 2017-11-07 RX ADMIN — LIDOCAINE HYDROCHLORIDE 0.5 ML: 10 INJECTION, SOLUTION INFILTRATION; PERINEURAL at 08:55

## 2017-11-07 RX ADMIN — STANDARDIZED SENNA CONCENTRATE AND DOCUSATE SODIUM 1 TABLET: 8.6; 5 TABLET, FILM COATED ORAL at 20:27

## 2017-11-07 RX ADMIN — DOCUSATE SODIUM 100 MG: 100 CAPSULE ORAL at 15:55

## 2017-11-07 RX ADMIN — HYDROCODONE BITARTRATE AND ACETAMINOPHEN 2 TABLET: 10; 325 TABLET ORAL at 17:23

## 2017-11-07 RX ADMIN — METHOCARBAMOL 750 MG: 750 TABLET ORAL at 20:26

## 2017-11-07 RX ADMIN — OXYCODONE HYDROCHLORIDE 10 MG: 5 SOLUTION ORAL at 14:00

## 2017-11-07 RX ADMIN — SODIUM CHLORIDE, SODIUM LACTATE, POTASSIUM CHLORIDE, CALCIUM CHLORIDE: 600; 310; 30; 20 INJECTION, SOLUTION INTRAVENOUS at 08:55

## 2017-11-07 RX ADMIN — ANTACID TABLETS 500 MG: 500 TABLET, CHEWABLE ORAL at 20:26

## 2017-11-07 RX ADMIN — HYDROCODONE BITARTRATE AND ACETAMINOPHEN 2 TABLET: 10; 325 TABLET ORAL at 21:53

## 2017-11-07 RX ADMIN — ANTACID TABLETS 500 MG: 500 TABLET, CHEWABLE ORAL at 15:55

## 2017-11-07 RX ADMIN — FENTANYL CITRATE 25 MCG: 50 INJECTION, SOLUTION INTRAMUSCULAR; INTRAVENOUS at 14:30

## 2017-11-07 RX ADMIN — FENTANYL CITRATE 50 MCG: 50 INJECTION, SOLUTION INTRAMUSCULAR; INTRAVENOUS at 14:15

## 2017-11-07 ASSESSMENT — LIFESTYLE VARIABLES: DO YOU DRINK ALCOHOL: NO

## 2017-11-07 ASSESSMENT — PAIN SCALES - GENERAL
PAINLEVEL_OUTOF10: 7
PAINLEVEL_OUTOF10: 9
PAINLEVEL_OUTOF10: 6
PAINLEVEL_OUTOF10: 9
PAINLEVEL_OUTOF10: 7
PAINLEVEL_OUTOF10: 5
PAINLEVEL_OUTOF10: 8
PAINLEVEL_OUTOF10: 5
PAINLEVEL_OUTOF10: 7
PAINLEVEL_OUTOF10: 5
PAINLEVEL_OUTOF10: 3
PAINLEVEL_OUTOF10: 6

## 2017-11-07 ASSESSMENT — PATIENT HEALTH QUESTIONNAIRE - PHQ9
1. LITTLE INTEREST OR PLEASURE IN DOING THINGS: NOT AT ALL
SUM OF ALL RESPONSES TO PHQ9 QUESTIONS 1 AND 2: 0
SUM OF ALL RESPONSES TO PHQ9 QUESTIONS 1 AND 2: 0
SUM OF ALL RESPONSES TO PHQ QUESTIONS 1-9: 0
2. FEELING DOWN, DEPRESSED, IRRITABLE, OR HOPELESS: NOT AT ALL
1. LITTLE INTEREST OR PLEASURE IN DOING THINGS: NOT AT ALL
2. FEELING DOWN, DEPRESSED, IRRITABLE, OR HOPELESS: NOT AT ALL
SUM OF ALL RESPONSES TO PHQ QUESTIONS 1-9: 0

## 2017-11-07 ASSESSMENT — COPD QUESTIONNAIRES
DURING THE PAST 4 WEEKS HOW MUCH DID YOU FEEL SHORT OF BREATH: NONE/LITTLE OF THE TIME
HAVE YOU SMOKED AT LEAST 100 CIGARETTES IN YOUR ENTIRE LIFE: NO/DON'T KNOW
COPD SCREENING SCORE: 0
DO YOU EVER COUGH UP ANY MUCUS OR PHLEGM?: NO/ONLY WITH OCCASIONAL COLDS OR INFECTIONS

## 2017-11-07 NOTE — OR SURGEON
Immediate Post OP Note    PreOp Diagnosis: L5-S1 DDD, radiculopathy.     PostOp Diagnosis: Same.     Procedure(s):  LUMBAR FUSION ANTERIOR- STAGE #1 L5-S1 ALIF - Wound Class: Clean    Surgeon(s):  JENNA Clarke M.D.    Anesthesiologist/Type of Anesthesia:  Anesthesiologist: Peace Ho M.D./General    Surgical Staff:  Assistant: Sohan Boucher P.A.-C.  Cell Saver : Alysha Andrea  Circulator: Claritza Turner R.N.  Relief Circulator: Lyla Hearn R.N.  Scrub Person: Lisa Ziegler; Janice Gao  Radiology Technologist: Maverick STEEL Gross    Specimens:  * No specimens in log *    Estimated Blood Loss: 100 cc     Findings: L5-1 DDD, see dictation,     Complications: None.          11/7/2017 1:33 PM Sohan Boucher

## 2017-11-07 NOTE — OP REPORT
DATE OF SERVICE:  11/07/2017    SURGEON:  Nayan Camp MD    CO-SURGEON:  Jarrell Vega MD    PROCEDURE:  Anterior left iliac retroperitoneal approach for L5-S1 diskectomy,   fusion, and plating.    ANESTHESIA:  General.    ESTIMATED BLOOD LOSS:  Less than 50 mL    PREOPERATIVE DIAGNOSIS:  Degenerative disk disease with spondylolisthesis.    POSTOPERATIVE DIAGNOSIS:  Degenerative disk disease with spondylolisthesis.    SUMMARY:  This 47-year-old female had scoliosis correction with a posterior   decompression and rods as a teenager.    She complains of back pain with radiculopathy and has evidence for a slip of   L5 on S1 secondary to a pars defect and collapse of the L5-S1 disk space, she   is scheduled to undergo anterior and posterior decompression and fusion.    DESCRIPTION OF PROCEDURE:  The patient was taken to the operating room and   satisfactory general anesthesia was induced, endotracheal intubation.  Patient   was prepped and draped.  Left iliac incision was made based on the x-ray and   carried down through the skin and subcutaneous tissue.  The left rectus sheath   was scored and the left rectus muscle freed up.  The retroperitoneal space   was entered and the retroperitoneal contents brought to the patient's right   including left ureter.  Self-retaining retractors were placed in the depths of   wound, exposure was obtained by mobilizing the soft tissue structures   overlying the sacral promontory and identifying the appropriate level in the   AP and lateral projections.    Please see the dictation of Dr. Jarrell Vega for the diskectomy, fusion and   plating.    When this was successfully completed, x-rays were negative for retained   foreign bodies and the patient was closed with interrupted #1 Vicryl sutures   in the fascia and running 4-0 Vicryl subcuticular skin stitches.  Wound was   dressed and patient sent to recovery room in good condition.  No specimens   were sent to pathology.        ____________________________________     MD CANDIDA CHANG    DD:  11/07/2017 13:21:11  DT:  11/07/2017 15:41:31    D#:  [id number]  Job#:  [id number]    cc: JASPER DOW MD

## 2017-11-07 NOTE — OP REPORT
DATE OF SERVICE:  11/07/2017    PREOPERATIVE DIAGNOSES:  1.  Bilateral L5-S1 radiculopathies.  2.  Grade II spondylolisthesis at L5-S1.  3.  Bilateral L5 pars defects.  4.  L5-S1 degenerative disk disease.  5.  Chronic refractory low back pain.    POSTOPERATIVE DIAGNOSES:  1.  Bilateral L5-S1 radiculopathies.  2.  Grade II spondylolisthesis at L5-S1.  3.  Bilateral L5 pars defects.  4.  L5-S1 degenerative disk disease.  5.  Chronic refractory low back pain.    PROCEDURES PERFORMED:  Stage I of a planned 2-stage procedure for anterior,   posterior, circumferential decompression and fusion at L5-S1.  Stage I:  1.  Minimally invasive L5-S1 ALIF.  2.  Partial corpectomy of L5.  3.  Partial corpectomy of S1.  4.  L5-S1 arthrodesis with Titan titanium cage with DBM bone graft extension.  5.  Insertion of PEEK interbody cage at 1 level.  6.  L5-S1 anterior instrumentation with screws through the cage plate system   at L5 and S1.  7.  Modifier-22 for extra degree of difficulty given the patient's severe   grade II spondylolisthesis, her extremely steep angle of L5-S1 and the depth   that we were working at, which all added an extra hour to the standard   surgical procedure.  8.  SSEP and EMG monitoring performed by neuro monitoring associates, which   remained stable throughout.    CO-SURGEONS:  1.  Jarrell Vega MD, neurosurgery-spine surgery.  2.  Nayan Camp MD, general vascular surgery.    SECOND ASSISTANT:  Sohna Boucher PA-C    ANESTHESIA:  General endotracheal anesthesia.    ANESTHESIOLOGIST:  Peace Ho MD    COMPLICATIONS:  None.    ESTIMATED BLOOD LOSS:  Less than 50 mL.    PREOPERATIVE NOTE:  This is an extremely pleasant 47-year-old lady who   presents with chronic low back pain and bilateral lower extremity radicular   leg pain, weakness and paresthesia consistent with bilateral L5-S1   radiculopathies.  An MRI showed L5-S1 degenerative disk disease with severe   collapse and foraminal  stenosis bilaterally with a grade II spondylolisthesis,   which was unstable in addition to chronic bilateral L5 pars defects.  The   patient failed extensive conservative care including physical therapy and   injections and pain management.  Given the patient's MRI findings and   neurological deficits and imaging, I offered the patient the above listed   procedure for L5-S1 ALIF with reduction of her spondylolisthesis followed by   stage II posterior decompression and instrumented stabilization.  I discussed   surgical procedure, alternatives, goals, risks and benefits, complications in   detail with the patient, used a bone model, MRI and educational handouts to   assist the patient with her decision making, answered all questions to the   best of my ability.  The patient understood and consented to surgery.  Details   are well contained in the office visit notes.    NARRATIVE DICTATION:  The patient was brought to the operating room and placed   under endotracheal anesthesia.  She was placed supine on the operating table   with care taken about the bony prominences, peripheral nerves.  The abdominal   layer was prepped and draped in usual sterile fashion.  Following localization   with cross table fluoroscopy, a small transverse low abdominal incision was   made and the left retroperitoneal exposure of L5-S1 was accomplished.  The   vessels were mobilized.  Excellent exposure was achieved.  Given the grade II   spondylolisthesis on the steep pelvic angle with the L5 body draped over the   disk space, this essentially brought the vessels lower than standard and   stretched from over the disk space.  This in combination with the steep pelvic   angle all added an extra degree of expertise, effort and time to the surgical   procedure adding over an hour to the standard surgical procedure and hence a   modifier-22 is required to reflect this degree of difficulty and time.    Once excellent exposure was achieved at  L5-S1 and the midline was identified,   I incised the disk at L5-S1, removed the disk from the endplate.  Dr. Camp then was first assistant with Sohan Boucher second assistant   given the patient's body habitus and her depth, we held retractors in place,   keeping the vessels isolated away from the disk space.  I used the Cohn   elevator to remove the disk from the endplate.  There was an extremely steep   angle to the disk space at L5-S1 and we are able to use angled instruments to   get there.  Given the collapse from the lack of distraction, partial   corpectomies of L5 and S1 were completed in a standard fashion in this   particular instance by removing a third of the vertebral bodies.  This needs   to be reflected and medically necessary in this situation.  I released the   anulus bilaterally and sequentially distracted them.  I released the foramen   with a curette and opened up the area for the nerve roots.  I sequentially   distracted well and then sized up for the large Titan lordotic cage.  The   appropriate titanium cage was chosen after templating and then packed with DBM   bone graft extension and then delivered under distraction between the body of   L5-S1 with the reduction tool for an excellent A plus fit.  This reduced the   spondylolisthesis to essentially anatomical alignment and reduced what was   very collapsed grade II spondylolisthesis to essentially normal anatomical   alignment.  I was very satisfied with the degree of reduction that was   achieved.  I then placed a single central screw at L5 using 30 mm screws into   the body through the plate combination and then placed 2 screws through the   cage plate at S1 level.  All screws had excellent purchase.  The wound was   irrigated with bacitracin irrigation.  The x-rays confirmed excellent position   of the cage and the screws with excellent restored lordosis.  The wound was   irrigated and immaculate hemostasis achieved.  The wound  was closed in   multiple layers as dictated by Dr. Camp.  Swabs, needles, instruments   correct by 2 count.  No complications were encountered.  The patient tolerated   procedure, stable and transferred to recovery room.  The patient will be   observed for several days until she meets discharge criteria and she will   proceed with stage II planned procedure.    INTRAOPERATIVE FINDINGS:  Severe stenosis at L5-S1 bilaterally and a grade II   spondylolisthesis, which was all freed up and restored with global alignment   and anatomical reduction to anatomical correction.  No complications were   encountered.  The patient was stable in recovery room.    The patient will follow up at [facility]   in 2 weeks and 6 weeks' time as arranged.  Stage II procedure is planned for   several days' time.       ____________________________________     JASPER DOW MD    JMYRON / NTS    DD:  11/07/2017 13:32:14  DT:  11/07/2017 13:57:54    D#:  [id number]  Job#:  [id number]    cc: ANTIONETTE NUÑEZ MD

## 2017-11-08 ENCOUNTER — APPOINTMENT (OUTPATIENT)
Dept: RADIOLOGY | Facility: MEDICAL CENTER | Age: 47
DRG: 455 | End: 2017-11-08
Attending: PHYSICIAN ASSISTANT
Payer: COMMERCIAL

## 2017-11-08 LAB
ANION GAP SERPL CALC-SCNC: 8 MMOL/L (ref 0–11.9)
BUN SERPL-MCNC: 11 MG/DL (ref 8–22)
CALCIUM SERPL-MCNC: 8.4 MG/DL (ref 8.5–10.5)
CHLORIDE SERPL-SCNC: 95 MMOL/L (ref 96–112)
CO2 SERPL-SCNC: 27 MMOL/L (ref 20–33)
CREAT SERPL-MCNC: 0.71 MG/DL (ref 0.5–1.4)
ERYTHROCYTE [DISTWIDTH] IN BLOOD BY AUTOMATED COUNT: 43.4 FL (ref 35.9–50)
GFR SERPL CREATININE-BSD FRML MDRD: >60 ML/MIN/1.73 M 2
GLUCOSE SERPL-MCNC: 205 MG/DL (ref 65–99)
HCT VFR BLD AUTO: 29 % (ref 37–47)
HGB BLD-MCNC: 10.3 G/DL (ref 12–16)
MCH RBC QN AUTO: 36.1 PG (ref 27–33)
MCHC RBC AUTO-ENTMCNC: 35.5 G/DL (ref 33.6–35)
MCV RBC AUTO: 101.8 FL (ref 81.4–97.8)
PLATELET # BLD AUTO: 168 K/UL (ref 164–446)
PMV BLD AUTO: 8.4 FL (ref 9–12.9)
POTASSIUM SERPL-SCNC: 4 MMOL/L (ref 3.6–5.5)
RBC # BLD AUTO: 2.85 M/UL (ref 4.2–5.4)
SODIUM SERPL-SCNC: 130 MMOL/L (ref 135–145)
WBC # BLD AUTO: 8.4 K/UL (ref 4.8–10.8)

## 2017-11-08 PROCEDURE — 770001 HCHG ROOM/CARE - MED/SURG/GYN PRIV*

## 2017-11-08 PROCEDURE — 80048 BASIC METABOLIC PNL TOTAL CA: CPT

## 2017-11-08 PROCEDURE — A9270 NON-COVERED ITEM OR SERVICE: HCPCS | Performed by: PHYSICIAN ASSISTANT

## 2017-11-08 PROCEDURE — 700101 HCHG RX REV CODE 250: Performed by: PHYSICIAN ASSISTANT

## 2017-11-08 PROCEDURE — 97165 OT EVAL LOW COMPLEX 30 MIN: CPT

## 2017-11-08 PROCEDURE — G8987 SELF CARE CURRENT STATUS: HCPCS | Mod: CK

## 2017-11-08 PROCEDURE — G8988 SELF CARE GOAL STATUS: HCPCS | Mod: CI

## 2017-11-08 PROCEDURE — 36415 COLL VENOUS BLD VENIPUNCTURE: CPT

## 2017-11-08 PROCEDURE — 700102 HCHG RX REV CODE 250 W/ 637 OVERRIDE(OP): Performed by: PHYSICIAN ASSISTANT

## 2017-11-08 PROCEDURE — 700111 HCHG RX REV CODE 636 W/ 250 OVERRIDE (IP): Performed by: PHYSICIAN ASSISTANT

## 2017-11-08 PROCEDURE — G8979 MOBILITY GOAL STATUS: HCPCS | Mod: CI

## 2017-11-08 PROCEDURE — 97161 PT EVAL LOW COMPLEX 20 MIN: CPT

## 2017-11-08 PROCEDURE — 700112 HCHG RX REV CODE 229: Performed by: PHYSICIAN ASSISTANT

## 2017-11-08 PROCEDURE — 85027 COMPLETE CBC AUTOMATED: CPT

## 2017-11-08 PROCEDURE — 72131 CT LUMBAR SPINE W/O DYE: CPT

## 2017-11-08 PROCEDURE — G8978 MOBILITY CURRENT STATUS: HCPCS | Mod: CK

## 2017-11-08 RX ADMIN — STANDARDIZED SENNA CONCENTRATE AND DOCUSATE SODIUM 1 TABLET: 8.6; 5 TABLET, FILM COATED ORAL at 20:50

## 2017-11-08 RX ADMIN — ANTACID TABLETS 500 MG: 500 TABLET, CHEWABLE ORAL at 20:50

## 2017-11-08 RX ADMIN — HYDROMORPHONE HYDROCHLORIDE 0.5 MG: 2 INJECTION INTRAMUSCULAR; INTRAVENOUS; SUBCUTANEOUS at 20:55

## 2017-11-08 RX ADMIN — DOCUSATE SODIUM 100 MG: 100 CAPSULE ORAL at 10:28

## 2017-11-08 RX ADMIN — METHOCARBAMOL 750 MG: 750 TABLET ORAL at 06:15

## 2017-11-08 RX ADMIN — CEFAZOLIN SODIUM 2 G: 2 INJECTION, SOLUTION INTRAVENOUS at 06:15

## 2017-11-08 RX ADMIN — HYDROCODONE BITARTRATE AND ACETAMINOPHEN 2 TABLET: 10; 325 TABLET ORAL at 06:18

## 2017-11-08 RX ADMIN — HYDROCODONE BITARTRATE AND ACETAMINOPHEN 2 TABLET: 10; 325 TABLET ORAL at 19:14

## 2017-11-08 RX ADMIN — METHOCARBAMOL 750 MG: 750 TABLET ORAL at 14:37

## 2017-11-08 RX ADMIN — OXYCODONE HYDROCHLORIDE AND ACETAMINOPHEN 2 TABLET: 5; 325 TABLET ORAL at 23:28

## 2017-11-08 RX ADMIN — DOCUSATE SODIUM 100 MG: 100 CAPSULE ORAL at 20:50

## 2017-11-08 RX ADMIN — HYDROCODONE BITARTRATE AND ACETAMINOPHEN 2 TABLET: 10; 325 TABLET ORAL at 14:34

## 2017-11-08 RX ADMIN — HYDROCODONE BITARTRATE AND ACETAMINOPHEN 2 TABLET: 10; 325 TABLET ORAL at 02:18

## 2017-11-08 RX ADMIN — ENOXAPARIN SODIUM 40 MG: 100 INJECTION SUBCUTANEOUS at 10:28

## 2017-11-08 RX ADMIN — FLUTICASONE PROPIONATE 50 MCG: 50 SPRAY, METERED NASAL at 10:28

## 2017-11-08 RX ADMIN — LORATADINE 10 MG: 10 TABLET ORAL at 10:28

## 2017-11-08 RX ADMIN — DIPHENHYDRAMINE HYDROCHLORIDE 25 MG: 50 INJECTION INTRAMUSCULAR; INTRAVENOUS at 06:15

## 2017-11-08 RX ADMIN — HYDROCODONE BITARTRATE AND ACETAMINOPHEN 2 TABLET: 10; 325 TABLET ORAL at 10:27

## 2017-11-08 RX ADMIN — ANTACID TABLETS 500 MG: 500 TABLET, CHEWABLE ORAL at 10:28

## 2017-11-08 RX ADMIN — METHOCARBAMOL 750 MG: 750 TABLET ORAL at 20:50

## 2017-11-08 RX ADMIN — POTASSIUM CHLORIDE, DEXTROSE MONOHYDRATE AND SODIUM CHLORIDE: 150; 5; 450 INJECTION, SOLUTION INTRAVENOUS at 20:50

## 2017-11-08 ASSESSMENT — GAIT ASSESSMENTS
ASSISTIVE DEVICE: FRONT WHEEL WALKER
GAIT LEVEL OF ASSIST: STAND BY ASSIST
DISTANCE (FEET): 150

## 2017-11-08 ASSESSMENT — COGNITIVE AND FUNCTIONAL STATUS - GENERAL
MOVING FROM LYING ON BACK TO SITTING ON SIDE OF FLAT BED: A LITTLE
MOVING TO AND FROM BED TO CHAIR: A LITTLE
TOILETING: A LITTLE
SUGGESTED CMS G CODE MODIFIER DAILY ACTIVITY: CK
MOBILITY SCORE: 21
DAILY ACTIVITIY SCORE: 19
DRESSING REGULAR LOWER BODY CLOTHING: A LITTLE
SUGGESTED CMS G CODE MODIFIER MOBILITY: CJ
DRESSING REGULAR UPPER BODY CLOTHING: A LITTLE
PERSONAL GROOMING: A LITTLE
TURNING FROM BACK TO SIDE WHILE IN FLAT BAD: A LITTLE
HELP NEEDED FOR BATHING: A LITTLE

## 2017-11-08 ASSESSMENT — PAIN SCALES - GENERAL
PAINLEVEL_OUTOF10: 6
PAINLEVEL_OUTOF10: 3
PAINLEVEL_OUTOF10: 10

## 2017-11-08 ASSESSMENT — ACTIVITIES OF DAILY LIVING (ADL): TOILETING: INDEPENDENT

## 2017-11-08 NOTE — PROGRESS NOTES
Neurosurgery Progress Note    Subjective:  POD#1 ALIF L5-S1,    Scheduled for Stage 2 L5-S1 Lami Fusion on Friday  Doing well this am.  BP low but asymptomatic.  Passing gas, but no BM yet.   Abd pain improving--Pain manageable  Legs feel stronger/better.  Pleased with improvement.   Not OOB yet      Exam:  Adb wound C/D/I   Soft, Mildly tender, No rebound or guarding.  Muscle strength 5/5.  DTRs +2   Sensory intact    BP  Min: 89/55  Max: 113/82  Pulse  Av.6  Min: 85  Max: 95  Resp  Av.7  Min: 12  Max: 20  Temp  Av.3 °C (97.4 °F)  Min: 36.1 °C (96.9 °F)  Max: 36.9 °C (98.4 °F)  SpO2  Av.7 %  Min: 95 %  Max: 100 %    No Data Recorded        Recent Labs      17   0406   SODIUM  130*   POTASSIUM  4.0   CHLORIDE  95*   CO2  27   GLUCOSE  205*   BUN  11               Intake/Output       17 0700 - 17 0659 17 0700 - 17 0659      [id number] 9849-3428 Total 6631-5091 3009-2645 Total       Intake    I.V.  1800  -- 1800  --  -- --    Crystalloid Intake 1000 -- 1000 -- -- --    IV Volume (0.9NS) 800 -- 800 -- -- --    Total Intake 1800 -- 1800 -- -- --       Output    Urine  350  3275 3625  --  -- --    Indwelling Cathether 350 3275 3625 -- -- --    Blood  100  -- 100  --  -- --    Est. Blood Loss (mL) 100 -- 100 -- -- --    Total Output 450 3275 3725 -- -- --       Net I/O     1350 -3275 -1925 -- -- --            Intake/Output Summary (Last 24 hours) at 17 0716  Last data filed at 17 0600   Gross per 24 hour   Intake             1800 ml   Output             3725 ml   Net            -1925 ml            • lidocaine-prilocaine  1 Application Once PRN    Or   • lidocaine  0.5 mL Once PRN   • fluticasone  1 Spray DAILY   • lisinopril  20 mg DAILY   • loratadine  10 mg DAILY   • Pharmacy Consult Request  1 Each PRN   • MD ISAACS...Do not administer NSAIDS or ASPIRIN unless ORDERED By Neurosurgery  1 Each PRN   • docusate sodium  100 mg BID   • senna-docusate  1 Tab Nightly    • senna-docusate  1 Tab Q24HRS PRN   • polyethylene glycol/lytes  1 Packet BID PRN   • magnesium hydroxide  30 mL QDAY PRN   • bisacodyl  10 mg Q24HRS PRN   • fleet  1 Each Once PRN   • dextrose 5 % and 0.45 % NaCl with KCl 20 mEq   Continuous   • enoxaparin  40 mg DAILY   • diphenhydrAMINE  25 mg Q6HRS PRN    Or   • diphenhydrAMINE  25 mg Q6HRS PRN   • ondansetron  4 mg Q4HRS PRN   • ondansetron  4 mg Q4HRS PRN   • promethazine  12.5-25 mg Q4HRS PRN   • promethazine  12.5-25 mg Q4HRS PRN   • prochlorperazine  5-10 mg Q4HRS PRN   • methocarbamol  750 mg Q8HRS PRN   • alprazolam  0.25 mg BID PRN   • labetalol  10 mg Q HOUR PRN   • benzocaine-menthol  1 Lozenge Q2HRS PRN   • calcium carbonate  500 mg BID   • hydrocodone/acetaminophen  1-2 Tab Q4HRS PRN   • oxycodone-acetaminophen  1-2 Tab Q4HRS PRN   • hydromorphone  0.5 mg Q3HRS PRN       Assessment and Plan:  POD #1 ALIF L5-S1    Scheduled for Stage 2 L5-S1 Lami Fusion on Friday  PT/OT to ambulate today  Morel out today  Continue pain management.  Prophylactic anticoagulation: no

## 2017-11-08 NOTE — PROGRESS NOTES
Pt arrived to unit by bed. Pt stated 8 out of 10 pain at this time in the abdomen area. Pt alert and oriented x4. Bed in low position call bell within reach half side rails up. Will continue to assess. Family at bedside.

## 2017-11-08 NOTE — THERAPY
"Physical Therapy Evaluation completed.     Patient is 47/F POD#1 ALIF L5-S1, scheduled for Stage 2 L5-S1 Lami Fusion on Friday 11/10. Patient lives with significant other in single story home, small step at entry. PLOF INDEP ADLs and short distance amb w/o AD. Patient presents today with SBA for transfers and gait w/FWW x150 ft. Gait is steady, slow pace. Patient okay to ambulate with SBA w/nsg w/FWW. Will f/u after sx on Friday to assess for any additional needs. Patient may need a FWW.     Bed Mobility:  Supine to Sit:  (up with OT upon arrival)  Transfers: Sit to Stand: Stand by Assist  Gait: Level Of Assist: Stand by Assist with Front-Wheel Walker       Plan of Care: Will benefit from Physical Therapy 2 times per week  Discharge Recommendations: Equipment: Will Continue to Assess for Equipment Needs. Post-acute therapy Discharge to home with outpatient or home health for additional skilled therapy services. Pending progress post 2nd sx on 11/10.     See \"Rehab Therapy-Acute\" Patient Summary Report for complete documentation.     "

## 2017-11-08 NOTE — THERAPY
"Occupational Therapy Evaluation completed.   Functional Status: Pt educated on spinal precautions and logroll. Min A logroll supine>sit with HOB elevated, use of bed rail and verbal cues. CGA LB dressing with pants. Educated on brace management, min A to don brace sitting EOB. SBA sit>stand and walking within room. SBA grooming at sink. Pt left up with PT, RN aware.  Plan of Care: Will benefit from Occupational Therapy 3 times per week  Discharge Recommendations:  Equipment: Front-Wheel Walker and Will Continue to Assess for Equipment Needs. Post-acute therapy Will continue to assess post-acute needs after second surgery on 11/10.    48 y/o female s/p phase 1 of 2 stage lumbar surgery. Pt demonstrates increased pain, decreased balance and decreased activity tolerance. These deficits are currently limiting pt's functional mobility and transfers. Pt does demonstrate functional use of BUE and understanding of spinal precautions. Will continue to follow pt after second surgery for recommendations on acute and post acute therapy.    See \"Rehab Therapy-Acute\" Patient Summary Report for complete documentation.    "

## 2017-11-09 LAB
ANION GAP SERPL CALC-SCNC: 6 MMOL/L (ref 0–11.9)
BUN SERPL-MCNC: 9 MG/DL (ref 8–22)
CALCIUM SERPL-MCNC: 8.7 MG/DL (ref 8.5–10.5)
CHLORIDE SERPL-SCNC: 104 MMOL/L (ref 96–112)
CO2 SERPL-SCNC: 25 MMOL/L (ref 20–33)
CREAT SERPL-MCNC: 0.72 MG/DL (ref 0.5–1.4)
ERYTHROCYTE [DISTWIDTH] IN BLOOD BY AUTOMATED COUNT: 44.6 FL (ref 35.9–50)
GFR SERPL CREATININE-BSD FRML MDRD: >60 ML/MIN/1.73 M 2
GLUCOSE SERPL-MCNC: 109 MG/DL (ref 65–99)
HCT VFR BLD AUTO: 27.1 % (ref 37–47)
HGB BLD-MCNC: 9.2 G/DL (ref 12–16)
MCH RBC QN AUTO: 35 PG (ref 27–33)
MCHC RBC AUTO-ENTMCNC: 33.9 G/DL (ref 33.6–35)
MCV RBC AUTO: 103 FL (ref 81.4–97.8)
PLATELET # BLD AUTO: 162 K/UL (ref 164–446)
PMV BLD AUTO: 8.8 FL (ref 9–12.9)
POTASSIUM SERPL-SCNC: 3.8 MMOL/L (ref 3.6–5.5)
RBC # BLD AUTO: 2.63 M/UL (ref 4.2–5.4)
SODIUM SERPL-SCNC: 135 MMOL/L (ref 135–145)
WBC # BLD AUTO: 5.5 K/UL (ref 4.8–10.8)

## 2017-11-09 PROCEDURE — 80048 BASIC METABOLIC PNL TOTAL CA: CPT

## 2017-11-09 PROCEDURE — 85027 COMPLETE CBC AUTOMATED: CPT

## 2017-11-09 PROCEDURE — 700101 HCHG RX REV CODE 250: Performed by: PHYSICIAN ASSISTANT

## 2017-11-09 PROCEDURE — 770001 HCHG ROOM/CARE - MED/SURG/GYN PRIV*

## 2017-11-09 PROCEDURE — 36415 COLL VENOUS BLD VENIPUNCTURE: CPT

## 2017-11-09 PROCEDURE — 700102 HCHG RX REV CODE 250 W/ 637 OVERRIDE(OP): Performed by: PHYSICIAN ASSISTANT

## 2017-11-09 PROCEDURE — 700112 HCHG RX REV CODE 229: Performed by: PHYSICIAN ASSISTANT

## 2017-11-09 PROCEDURE — A9270 NON-COVERED ITEM OR SERVICE: HCPCS | Performed by: PHYSICIAN ASSISTANT

## 2017-11-09 RX ORDER — OXYCODONE HYDROCHLORIDE 10 MG/1
10 TABLET ORAL EVERY 4 HOURS PRN
Status: DISCONTINUED | OUTPATIENT
Start: 2017-11-09 | End: 2017-11-12 | Stop reason: HOSPADM

## 2017-11-09 RX ORDER — OXYCODONE HYDROCHLORIDE 10 MG/1
20 TABLET ORAL EVERY 4 HOURS PRN
Status: DISCONTINUED | OUTPATIENT
Start: 2017-11-09 | End: 2017-11-12 | Stop reason: HOSPADM

## 2017-11-09 RX ORDER — METOCLOPRAMIDE 10 MG/1
10 TABLET ORAL
Status: DISCONTINUED | OUTPATIENT
Start: 2017-11-09 | End: 2017-11-12 | Stop reason: HOSPADM

## 2017-11-09 RX ORDER — OXYCODONE HYDROCHLORIDE 10 MG/1
10-20 TABLET ORAL EVERY 4 HOURS PRN
Status: DISCONTINUED | OUTPATIENT
Start: 2017-11-09 | End: 2017-11-09

## 2017-11-09 RX ADMIN — METHOCARBAMOL 750 MG: 750 TABLET ORAL at 21:29

## 2017-11-09 RX ADMIN — LORATADINE 10 MG: 10 TABLET ORAL at 08:32

## 2017-11-09 RX ADMIN — METOCLOPRAMIDE HYDROCHLORIDE 10 MG: 10 TABLET ORAL at 11:18

## 2017-11-09 RX ADMIN — FLUTICASONE PROPIONATE 50 MCG: 50 SPRAY, METERED NASAL at 08:38

## 2017-11-09 RX ADMIN — METHOCARBAMOL 750 MG: 750 TABLET ORAL at 06:11

## 2017-11-09 RX ADMIN — OXYCODONE HYDROCHLORIDE 20 MG: 10 TABLET ORAL at 21:25

## 2017-11-09 RX ADMIN — METOCLOPRAMIDE HYDROCHLORIDE 10 MG: 10 TABLET ORAL at 17:17

## 2017-11-09 RX ADMIN — ANTACID TABLETS 500 MG: 500 TABLET, CHEWABLE ORAL at 21:25

## 2017-11-09 RX ADMIN — OXYCODONE HYDROCHLORIDE 20 MG: 10 TABLET ORAL at 17:04

## 2017-11-09 RX ADMIN — ANTACID TABLETS 500 MG: 500 TABLET, CHEWABLE ORAL at 08:32

## 2017-11-09 RX ADMIN — OXYCODONE HYDROCHLORIDE 20 MG: 10 TABLET ORAL at 13:18

## 2017-11-09 RX ADMIN — POTASSIUM CHLORIDE, DEXTROSE MONOHYDRATE AND SODIUM CHLORIDE: 150; 5; 450 INJECTION, SOLUTION INTRAVENOUS at 13:18

## 2017-11-09 RX ADMIN — LISINOPRIL 20 MG: 20 TABLET ORAL at 08:32

## 2017-11-09 RX ADMIN — DOCUSATE SODIUM 100 MG: 100 CAPSULE ORAL at 08:32

## 2017-11-09 RX ADMIN — OXYCODONE HYDROCHLORIDE AND ACETAMINOPHEN 2 TABLET: 5; 325 TABLET ORAL at 03:36

## 2017-11-09 RX ADMIN — OXYCODONE HYDROCHLORIDE 20 MG: 10 TABLET ORAL at 08:32

## 2017-11-09 RX ADMIN — STANDARDIZED SENNA CONCENTRATE AND DOCUSATE SODIUM 1 TABLET: 8.6; 5 TABLET, FILM COATED ORAL at 21:25

## 2017-11-09 RX ADMIN — DOCUSATE SODIUM 100 MG: 100 CAPSULE ORAL at 21:25

## 2017-11-09 RX ADMIN — POLYETHYLENE GLYCOL 3350 1 PACKET: 17 POWDER, FOR SOLUTION ORAL at 08:34

## 2017-11-09 ASSESSMENT — PAIN SCALES - GENERAL
PAINLEVEL_OUTOF10: 3
PAINLEVEL_OUTOF10: 7
PAINLEVEL_OUTOF10: 10
PAINLEVEL_OUTOF10: 3
PAINLEVEL_OUTOF10: 7
PAINLEVEL_OUTOF10: 8
PAINLEVEL_OUTOF10: 8

## 2017-11-09 NOTE — PROGRESS NOTES
Neurosurgery Progress Note    Subjective:  POD#2 ALIF L5-S1,    Scheduled for Stage 2 L5-S1 Lami Fusion on Friday  Doing fine, pain not well controlled.  Passing gas, but no BM yet. ab  Abd pain improving--Pain manageable  Legs feel stronger/better.  H&H trending down.  CT lumbar yesterday looks good.         Exam:  Adb wound C/D/I   Soft, Mildly tender, No rebound or guarding.  Muscle strength 5/5, some breakaway weakness to left EHL.  DTRs +2   Sensory intact    BP  Min: 88/60  Max: 110/82  Pulse  Av.5  Min: 85  Max: 98  Resp  Av  Min: 18  Max: 18  Temp  Av.2 °C (97.2 °F)  Min: 36 °C (96.8 °F)  Max: 36.5 °C (97.7 °F)  SpO2  Av.4 %  Min: 96 %  Max: 99 %    No Data Recorded    Recent Labs      17   0406  17   0145   WBC  8.4  5.5   RBC  2.85*  2.63*   HEMOGLOBIN  10.3*  9.2*   HEMATOCRIT  29.0*  27.1*   MCV  101.8*  103.0*   MCH  36.1*  35.0*   MCHC  35.5*  33.9   RDW  43.4  44.6   PLATELETCT  168  162*   MPV  8.4*  8.8*     Recent Labs      17   0406  17   0145   SODIUM  130*  135   POTASSIUM  4.0  3.8   CHLORIDE  95*  104   CO2  27  25   GLUCOSE  205*  109*   BUN  11  9               Intake/Output     None          No intake or output data in the 24 hours ending 17 0745         • metoclopramide  10 mg TID AC   • oxycodone immediate-release  10-20 mg Q4HRS PRN   • fluticasone  1 Spray DAILY   • lisinopril  20 mg DAILY   • loratadine  10 mg DAILY   • Pharmacy Consult Request  1 Each PRN   • MD ALERT...Do not administer NSAIDS or ASPIRIN unless ORDERED By Neurosurgery  1 Each PRN   • docusate sodium  100 mg BID   • senna-docusate  1 Tab Nightly   • senna-docusate  1 Tab Q24HRS PRN   • polyethylene glycol/lytes  1 Packet BID PRN   • magnesium hydroxide  30 mL QDAY PRN   • bisacodyl  10 mg Q24HRS PRN   • fleet  1 Each Once PRN   • dextrose 5 % and 0.45 % NaCl with KCl 20 mEq   Continuous   • diphenhydrAMINE  25 mg Q6HRS PRN    Or   • diphenhydrAMINE  25 mg Q6HRS PRN   •  ondansetron  4 mg Q4HRS PRN   • ondansetron  4 mg Q4HRS PRN   • promethazine  12.5-25 mg Q4HRS PRN   • promethazine  12.5-25 mg Q4HRS PRN   • prochlorperazine  5-10 mg Q4HRS PRN   • methocarbamol  750 mg Q8HRS PRN   • alprazolam  0.25 mg BID PRN   • labetalol  10 mg Q HOUR PRN   • benzocaine-menthol  1 Lozenge Q2HRS PRN   • calcium carbonate  500 mg BID   • hydrocodone/acetaminophen  1-2 Tab Q4HRS PRN   • oxycodone-acetaminophen  1-2 Tab Q4HRS PRN   • hydromorphone  0.5 mg Q3HRS PRN       Assessment and Plan:  POD #2 ALIF L5-S1    Scheduled for Stage 2 L5-S1 Lami Fusion on Friday, NPO after midnight.  Adjusting pain meds.  PT/OT/ambulate as tolerated today  Will add reglan.  Follow H&H.

## 2017-11-09 NOTE — THERAPY
PT contact note:  pt reports she has been up walking w/ Rn staff a full lap around the unit, going to the BR frequently w/ staff and sitting up in chair. pt reports to fatigue for further mobility at thids time. Will follow up after surgery.

## 2017-11-09 NOTE — PROGRESS NOTES
Pt. Is AAOx4  Pt. Moves all extremeties,  Denies numbness and tingling  Complains of pain at abdomen 10/10  Pt.medicated per MAR given for pain  Pt. Up with one person assistance  18 ga in LFA Patent, site CDI  Bed alarm on  SCD's refused  Treaded socks in place  Call light within reach

## 2017-11-09 NOTE — CARE PLAN
Problem: Communication  Goal: The ability to communicate needs accurately and effectively will improve    Intervention: [place] patient and significant other/support system to call light to alert staff of needs  Pt. Reoriented to the location of the call light and instructed on it's use.       Problem: Pain Management  Goal: Pain level will decrease to patient's comfort goal    Intervention: Follow pain managment plan developed in collaboration with patient and Interdisciplinary Team  Pt. At this time is reporting that her pain management plan is ineffective

## 2017-11-10 ENCOUNTER — APPOINTMENT (OUTPATIENT)
Dept: RADIOLOGY | Facility: MEDICAL CENTER | Age: 47
DRG: 455 | End: 2017-11-10
Attending: NEUROLOGICAL SURGERY
Payer: COMMERCIAL

## 2017-11-10 LAB
ANION GAP SERPL CALC-SCNC: 8 MMOL/L (ref 0–11.9)
BUN SERPL-MCNC: 6 MG/DL (ref 8–22)
CALCIUM SERPL-MCNC: 9 MG/DL (ref 8.5–10.5)
CHLORIDE SERPL-SCNC: 104 MMOL/L (ref 96–112)
CO2 SERPL-SCNC: 23 MMOL/L (ref 20–33)
CREAT SERPL-MCNC: 0.63 MG/DL (ref 0.5–1.4)
ERYTHROCYTE [DISTWIDTH] IN BLOOD BY AUTOMATED COUNT: 45.5 FL (ref 35.9–50)
GFR SERPL CREATININE-BSD FRML MDRD: >60 ML/MIN/1.73 M 2
GLUCOSE SERPL-MCNC: 94 MG/DL (ref 65–99)
HCT VFR BLD AUTO: 28.2 % (ref 37–47)
HGB BLD-MCNC: 9.7 G/DL (ref 12–16)
MCH RBC QN AUTO: 36.2 PG (ref 27–33)
MCHC RBC AUTO-ENTMCNC: 34.4 G/DL (ref 33.6–35)
MCV RBC AUTO: 105.2 FL (ref 81.4–97.8)
PLATELET # BLD AUTO: 175 K/UL (ref 164–446)
PMV BLD AUTO: 8.8 FL (ref 9–12.9)
POTASSIUM SERPL-SCNC: 4.3 MMOL/L (ref 3.6–5.5)
RBC # BLD AUTO: 2.68 M/UL (ref 4.2–5.4)
SODIUM SERPL-SCNC: 135 MMOL/L (ref 135–145)
WBC # BLD AUTO: 4.6 K/UL (ref 4.8–10.8)

## 2017-11-10 PROCEDURE — 700111 HCHG RX REV CODE 636 W/ 250 OVERRIDE (IP): Performed by: PHYSICIAN ASSISTANT

## 2017-11-10 PROCEDURE — 160042 HCHG SURGERY MINUTES - EA ADDL 1 MIN LEVEL 5: Performed by: NEUROLOGICAL SURGERY

## 2017-11-10 PROCEDURE — 700112 HCHG RX REV CODE 229: Performed by: PHYSICIAN ASSISTANT

## 2017-11-10 PROCEDURE — 0SG3071 FUSION OF LUMBOSACRAL JOINT WITH AUTOLOGOUS TISSUE SUBSTITUTE, POSTERIOR APPROACH, POSTERIOR COLUMN, OPEN APPROACH: ICD-10-PCS | Performed by: NEUROLOGICAL SURGERY

## 2017-11-10 PROCEDURE — 700102 HCHG RX REV CODE 250 W/ 637 OVERRIDE(OP): Performed by: PHYSICIAN ASSISTANT

## 2017-11-10 PROCEDURE — 500885 HCHG PACK, JACKSON TABLE: Performed by: NEUROLOGICAL SURGERY

## 2017-11-10 PROCEDURE — 502626 HCHG SURGIFLO HEMOSTATIC MATRIX 6ML: Performed by: NEUROLOGICAL SURGERY

## 2017-11-10 PROCEDURE — 160048 HCHG OR STATISTICAL LEVEL 1-5: Performed by: NEUROLOGICAL SURGERY

## 2017-11-10 PROCEDURE — 770001 HCHG ROOM/CARE - MED/SURG/GYN PRIV*

## 2017-11-10 PROCEDURE — 160035 HCHG PACU - 1ST 60 MINS PHASE I: Performed by: NEUROLOGICAL SURGERY

## 2017-11-10 PROCEDURE — A4314 CATH W/DRAINAGE 2-WAY LATEX: HCPCS | Performed by: NEUROLOGICAL SURGERY

## 2017-11-10 PROCEDURE — 80048 BASIC METABOLIC PNL TOTAL CA: CPT

## 2017-11-10 PROCEDURE — 4A11X4G MONITORING OF PERIPHERAL NERVOUS ELECTRICAL ACTIVITY, INTRAOPERATIVE, EXTERNAL APPROACH: ICD-10-PCS | Performed by: NEUROLOGICAL SURGERY

## 2017-11-10 PROCEDURE — 501838 HCHG SUTURE GENERAL: Performed by: NEUROLOGICAL SURGERY

## 2017-11-10 PROCEDURE — 36415 COLL VENOUS BLD VENIPUNCTURE: CPT

## 2017-11-10 PROCEDURE — 160009 HCHG ANES TIME/MIN: Performed by: NEUROLOGICAL SURGERY

## 2017-11-10 PROCEDURE — 700111 HCHG RX REV CODE 636 W/ 250 OVERRIDE (IP)

## 2017-11-10 PROCEDURE — 502627 HCHG HEMOSTAT, SURGICEL 4X4: Performed by: NEUROLOGICAL SURGERY

## 2017-11-10 PROCEDURE — 85027 COMPLETE CBC AUTOMATED: CPT

## 2017-11-10 PROCEDURE — 700101 HCHG RX REV CODE 250: Performed by: PHYSICIAN ASSISTANT

## 2017-11-10 PROCEDURE — 160031 HCHG SURGERY MINUTES - 1ST 30 MINS LEVEL 5: Performed by: NEUROLOGICAL SURGERY

## 2017-11-10 PROCEDURE — 160002 HCHG RECOVERY MINUTES (STAT): Performed by: NEUROLOGICAL SURGERY

## 2017-11-10 PROCEDURE — 500122 HCHG BOVIE, BLADE: Performed by: NEUROLOGICAL SURGERY

## 2017-11-10 PROCEDURE — 700101 HCHG RX REV CODE 250

## 2017-11-10 PROCEDURE — 72100 X-RAY EXAM L-S SPINE 2/3 VWS: CPT

## 2017-11-10 PROCEDURE — 95940 IONM IN OPERATNG ROOM 15 MIN: CPT | Performed by: NEUROLOGICAL SURGERY

## 2017-11-10 PROCEDURE — 500367 HCHG DRAIN KIT, HEMOVAC: Performed by: NEUROLOGICAL SURGERY

## 2017-11-10 PROCEDURE — A9270 NON-COVERED ITEM OR SERVICE: HCPCS | Performed by: PHYSICIAN ASSISTANT

## 2017-11-10 PROCEDURE — 502000 HCHG MISC OR IMPLANTS RC 0278: Performed by: NEUROLOGICAL SURGERY

## 2017-11-10 PROCEDURE — 160036 HCHG PACU - EA ADDL 30 MINS PHASE I: Performed by: NEUROLOGICAL SURGERY

## 2017-11-10 DEVICE — IMPLANTABLE DEVICE: Type: IMPLANTABLE DEVICE | Site: SPINE LUMBAR | Status: FUNCTIONAL

## 2017-11-10 RX ORDER — SODIUM CHLORIDE AND POTASSIUM CHLORIDE 150; 900 MG/100ML; MG/100ML
INJECTION, SOLUTION INTRAVENOUS CONTINUOUS
Status: DISCONTINUED | OUTPATIENT
Start: 2017-11-10 | End: 2017-11-12 | Stop reason: HOSPADM

## 2017-11-10 RX ORDER — BUPIVACAINE HYDROCHLORIDE AND EPINEPHRINE 5; 5 MG/ML; UG/ML
INJECTION, SOLUTION EPIDURAL; INTRACAUDAL; PERINEURAL
Status: DISCONTINUED | OUTPATIENT
Start: 2017-11-10 | End: 2017-11-10 | Stop reason: HOSPADM

## 2017-11-10 RX ORDER — SODIUM CHLORIDE, SODIUM LACTATE, POTASSIUM CHLORIDE, AND CALCIUM CHLORIDE .6; .31; .03; .02 G/100ML; G/100ML; G/100ML; G/100ML
IRRIGANT IRRIGATION
Status: DISCONTINUED | OUTPATIENT
Start: 2017-11-10 | End: 2017-11-10 | Stop reason: HOSPADM

## 2017-11-10 RX ORDER — CEFAZOLIN SODIUM 2 G/100ML
2 INJECTION, SOLUTION INTRAVENOUS EVERY 8 HOURS
Status: COMPLETED | OUTPATIENT
Start: 2017-11-10 | End: 2017-11-11

## 2017-11-10 RX ADMIN — OXYCODONE HYDROCHLORIDE 20 MG: 10 TABLET ORAL at 13:11

## 2017-11-10 RX ADMIN — OXYCODONE HYDROCHLORIDE 20 MG: 10 TABLET ORAL at 05:58

## 2017-11-10 RX ADMIN — DOCUSATE SODIUM 100 MG: 100 CAPSULE ORAL at 20:39

## 2017-11-10 RX ADMIN — METHOCARBAMOL 750 MG: 750 TABLET ORAL at 16:18

## 2017-11-10 RX ADMIN — ANTACID TABLETS 500 MG: 500 TABLET, CHEWABLE ORAL at 20:39

## 2017-11-10 RX ADMIN — METOCLOPRAMIDE HYDROCHLORIDE 10 MG: 10 TABLET ORAL at 16:18

## 2017-11-10 RX ADMIN — OXYCODONE HYDROCHLORIDE 20 MG: 10 TABLET ORAL at 17:33

## 2017-11-10 RX ADMIN — OXYCODONE HYDROCHLORIDE 20 MG: 10 TABLET ORAL at 21:56

## 2017-11-10 RX ADMIN — POTASSIUM CHLORIDE AND SODIUM CHLORIDE: 900; 150 INJECTION, SOLUTION INTRAVENOUS at 11:50

## 2017-11-10 RX ADMIN — POTASSIUM CHLORIDE AND SODIUM CHLORIDE: 900; 150 INJECTION, SOLUTION INTRAVENOUS at 22:11

## 2017-11-10 RX ADMIN — OXYCODONE HYDROCHLORIDE 20 MG: 10 TABLET ORAL at 01:19

## 2017-11-10 RX ADMIN — METOCLOPRAMIDE HYDROCHLORIDE 10 MG: 10 TABLET ORAL at 11:46

## 2017-11-10 RX ADMIN — STANDARDIZED SENNA CONCENTRATE AND DOCUSATE SODIUM 1 TABLET: 8.6; 5 TABLET, FILM COATED ORAL at 20:39

## 2017-11-10 RX ADMIN — HYDROMORPHONE HYDROCHLORIDE 0.5 MG: 2 INJECTION INTRAMUSCULAR; INTRAVENOUS; SUBCUTANEOUS at 20:40

## 2017-11-10 RX ADMIN — CEFAZOLIN SODIUM 2 G: 2 INJECTION, SOLUTION INTRAVENOUS at 17:35

## 2017-11-10 ASSESSMENT — PAIN SCALES - GENERAL
PAINLEVEL_OUTOF10: 0
PAINLEVEL_OUTOF10: 9
PAINLEVEL_OUTOF10: 0
PAINLEVEL_OUTOF10: 8
PAINLEVEL_OUTOF10: 6
PAINLEVEL_OUTOF10: 0
PAINLEVEL_OUTOF10: 7
PAINLEVEL_OUTOF10: 8
PAINLEVEL_OUTOF10: 9
PAINLEVEL_OUTOF10: 10
PAINLEVEL_OUTOF10: 10
PAINLEVEL_OUTOF10: 6
PAINLEVEL_OUTOF10: 3
PAINLEVEL_OUTOF10: 0

## 2017-11-10 NOTE — PROGRESS NOTES
Pt. Is AAOx4  Pt. Moves all extremeties,  Reports n&t in BLE  Pt. Denies pain at this time  18 ga in LFA Patent, site CDI  Bed alarm on  SCD's refused at this time  Treaded socks in place  Call light within reach   at bedside  Assumed care of pt. At approximately 1130

## 2017-11-10 NOTE — PROGRESS NOTES
Neurosurgery Progress Note    Subjective:  POD#3 ALIF L5-S1,    Scheduled for Stage 2 L5-S1 Lami Fusion today.  H&H slightly improved this AM.   CT lumbar yesterday looks good.         Exam:  Adb wound C/D/I   Muscle strength 5/5, some breakaway weakness to left EHL.  DTRs +2   Sensory intact    BP  Min: 111/83  Max: 130/90  Pulse  Av.8  Min: 89  Max: 106  Resp  Av.6  Min: 16  Max: 18  Temp  Av.4 °C (97.6 °F)  Min: 36.3 °C (97.3 °F)  Max: 36.9 °C (98.5 °F)  SpO2  Av.8 %  Min: 96 %  Max: 100 %    No Data Recorded    Recent Labs      11/08/17   0406  11/09/17   0145  11/10/17   0410   WBC  8.4  5.5  4.6*   RBC  2.85*  2.63*  2.68*   HEMOGLOBIN  10.3*  9.2*  9.7*   HEMATOCRIT  29.0*  27.1*  28.2*   MCV  101.8*  103.0*  105.2*   MCH  36.1*  35.0*  36.2*   MCHC  35.5*  33.9  34.4   RDW  43.4  44.6  45.5   PLATELETCT  168  162*  175   MPV  8.4*  8.8*  8.8*     Recent Labs      11/08/17   0406  11/09/17   0145  11/10/17   0410   SODIUM  130*  135  135   POTASSIUM  4.0  3.8  4.3   CHLORIDE  95*  104  104   CO2  27  25  23   GLUCOSE  205*  109*  94   BUN  11  9  6*               Intake/Output       17 - 11/10/17 0659 11/10/17 0700 - 1759       Total  Total       Intake    Total Intake -- -- -- -- -- --       Output    Urine  --  -- --  --  -- --    Number of Times Voided -- 4 x 4 x -- -- --    Stool  --  -- --  --  -- --    Number of Times Stooled 0 x -- 0 x -- -- --    Total Output -- -- -- -- -- --       Net I/O     -- -- -- -- -- --          No intake or output data in the 24 hours ending 11/10/17 0740         • metoclopramide  10 mg TID AC   • oxycodone immediate-release  10 mg Q4HRS PRN    Or   • oxycodone immediate-release  20 mg Q4HRS PRN   • fluticasone  1 Spray DAILY   • lisinopril  20 mg DAILY   • loratadine  10 mg DAILY   • Pharmacy Consult Request  1 Each PRN   • MD ALERT...Do not administer NSAIDS or ASPIRIN unless ORDERED By  Neurosurgery  1 Each PRN   • docusate sodium  100 mg BID   • senna-docusate  1 Tab Nightly   • senna-docusate  1 Tab Q24HRS PRN   • polyethylene glycol/lytes  1 Packet BID PRN   • magnesium hydroxide  30 mL QDAY PRN   • bisacodyl  10 mg Q24HRS PRN   • fleet  1 Each Once PRN   • dextrose 5 % and 0.45 % NaCl with KCl 20 mEq   Continuous   • diphenhydrAMINE  25 mg Q6HRS PRN    Or   • diphenhydrAMINE  25 mg Q6HRS PRN   • ondansetron  4 mg Q4HRS PRN   • ondansetron  4 mg Q4HRS PRN   • promethazine  12.5-25 mg Q4HRS PRN   • promethazine  12.5-25 mg Q4HRS PRN   • prochlorperazine  5-10 mg Q4HRS PRN   • methocarbamol  750 mg Q8HRS PRN   • alprazolam  0.25 mg BID PRN   • labetalol  10 mg Q HOUR PRN   • benzocaine-menthol  1 Lozenge Q2HRS PRN   • calcium carbonate  500 mg BID   • hydrocodone/acetaminophen  1-2 Tab Q4HRS PRN   • oxycodone-acetaminophen  1-2 Tab Q4HRS PRN   • hydromorphone  0.5 mg Q3HRS PRN       Assessment and Plan:  POD #3 ALIF L5-S1    Scheduled for Stage 2 L5-S1 Lami Fusion this AM.

## 2017-11-10 NOTE — OR SURGEON
Immediate Post OP Note    PreOp Diagnosis: L5-S1 spondylolisthesis, L5-S1 lumbar stenosis    PostOp Diagnosis: same    Procedure(s):  LUMBAR FUSION POSTERIOR- STAGE #2 L5-S1 - Wound Class: Clean with Drain  LUMBAR LAMINECTOMY DISKECTOMY - Wound Class: Clean with Drain    Surgeon(s):  Jarrell Vega M.D.    Anesthesiologist/Type of Anesthesia:  Anesthesiologist: Aditya Bartholomew M.D./General    Surgical Staff:  Assistant: Mil Munroe P.A.-C.  Circulator: Tatyana Richard R.N.  Scrub Person: Gamal Núñez  Radiology Technologist: Luz Calle    Specimens:  * No specimens in log *    Estimated Blood Loss: 25cc    Findings: spondylolisthesis at L5-S1 with bilateral foraminal stenosis.    Complications: None. Neuro monitoring stable.         11/10/2017 9:37 AM Mil Munroe

## 2017-11-11 PROCEDURE — G8979 MOBILITY GOAL STATUS: HCPCS | Mod: CI

## 2017-11-11 PROCEDURE — 700101 HCHG RX REV CODE 250: Performed by: PHYSICIAN ASSISTANT

## 2017-11-11 PROCEDURE — 97116 GAIT TRAINING THERAPY: CPT

## 2017-11-11 PROCEDURE — G8989 SELF CARE D/C STATUS: HCPCS | Mod: CI

## 2017-11-11 PROCEDURE — 700111 HCHG RX REV CODE 636 W/ 250 OVERRIDE (IP): Performed by: PHYSICIAN ASSISTANT

## 2017-11-11 PROCEDURE — A9270 NON-COVERED ITEM OR SERVICE: HCPCS | Performed by: PHYSICIAN ASSISTANT

## 2017-11-11 PROCEDURE — G8980 MOBILITY D/C STATUS: HCPCS | Mod: CI

## 2017-11-11 PROCEDURE — 97535 SELF CARE MNGMENT TRAINING: CPT

## 2017-11-11 PROCEDURE — 700112 HCHG RX REV CODE 229: Performed by: PHYSICIAN ASSISTANT

## 2017-11-11 PROCEDURE — 97530 THERAPEUTIC ACTIVITIES: CPT

## 2017-11-11 PROCEDURE — 700102 HCHG RX REV CODE 250 W/ 637 OVERRIDE(OP): Performed by: PHYSICIAN ASSISTANT

## 2017-11-11 PROCEDURE — 770001 HCHG ROOM/CARE - MED/SURG/GYN PRIV*

## 2017-11-11 PROCEDURE — G8988 SELF CARE GOAL STATUS: HCPCS | Mod: CI

## 2017-11-11 RX ORDER — METHOCARBAMOL 750 MG/1
750 TABLET, FILM COATED ORAL 4 TIMES DAILY
Status: DISCONTINUED | OUTPATIENT
Start: 2017-11-11 | End: 2017-11-12 | Stop reason: HOSPADM

## 2017-11-11 RX ADMIN — HYDROMORPHONE HYDROCHLORIDE 1 MG: 1 INJECTION, SOLUTION INTRAMUSCULAR; INTRAVENOUS; SUBCUTANEOUS at 19:42

## 2017-11-11 RX ADMIN — MAGNESIUM HYDROXIDE 30 ML: 400 SUSPENSION ORAL at 19:51

## 2017-11-11 RX ADMIN — METHOCARBAMOL 750 MG: 750 TABLET ORAL at 02:09

## 2017-11-11 RX ADMIN — POTASSIUM CHLORIDE AND SODIUM CHLORIDE: 900; 150 INJECTION, SOLUTION INTRAVENOUS at 12:52

## 2017-11-11 RX ADMIN — LISINOPRIL 20 MG: 20 TABLET ORAL at 09:01

## 2017-11-11 RX ADMIN — METOCLOPRAMIDE HYDROCHLORIDE 10 MG: 10 TABLET ORAL at 16:39

## 2017-11-11 RX ADMIN — HYDROMORPHONE HYDROCHLORIDE 1 MG: 1 INJECTION, SOLUTION INTRAMUSCULAR; INTRAVENOUS; SUBCUTANEOUS at 08:55

## 2017-11-11 RX ADMIN — HYDROMORPHONE HYDROCHLORIDE 0.5 MG: 2 INJECTION INTRAMUSCULAR; INTRAVENOUS; SUBCUTANEOUS at 05:40

## 2017-11-11 RX ADMIN — FLUTICASONE PROPIONATE 50 MCG: 50 SPRAY, METERED NASAL at 09:02

## 2017-11-11 RX ADMIN — OXYCODONE HYDROCHLORIDE 20 MG: 10 TABLET ORAL at 14:37

## 2017-11-11 RX ADMIN — METOCLOPRAMIDE HYDROCHLORIDE 10 MG: 10 TABLET ORAL at 10:40

## 2017-11-11 RX ADMIN — OXYCODONE HYDROCHLORIDE 20 MG: 10 TABLET ORAL at 02:09

## 2017-11-11 RX ADMIN — DOCUSATE SODIUM 100 MG: 100 CAPSULE ORAL at 09:01

## 2017-11-11 RX ADMIN — OXYCODONE HYDROCHLORIDE 20 MG: 10 TABLET ORAL at 23:14

## 2017-11-11 RX ADMIN — POLYETHYLENE GLYCOL 3350 1 PACKET: 17 POWDER, FOR SOLUTION ORAL at 09:01

## 2017-11-11 RX ADMIN — DOCUSATE SODIUM 100 MG: 100 CAPSULE ORAL at 19:45

## 2017-11-11 RX ADMIN — CEFAZOLIN SODIUM 2 G: 2 INJECTION, SOLUTION INTRAVENOUS at 02:05

## 2017-11-11 RX ADMIN — HYDROMORPHONE HYDROCHLORIDE 1 MG: 1 INJECTION, SOLUTION INTRAMUSCULAR; INTRAVENOUS; SUBCUTANEOUS at 12:00

## 2017-11-11 RX ADMIN — OXYCODONE HYDROCHLORIDE 20 MG: 10 TABLET ORAL at 18:27

## 2017-11-11 RX ADMIN — METHOCARBAMOL 750 MG: 750 TABLET ORAL at 16:38

## 2017-11-11 RX ADMIN — METHOCARBAMOL 750 MG: 750 TABLET ORAL at 19:45

## 2017-11-11 RX ADMIN — STANDARDIZED SENNA CONCENTRATE AND DOCUSATE SODIUM 1 TABLET: 8.6; 5 TABLET, FILM COATED ORAL at 19:45

## 2017-11-11 RX ADMIN — METHOCARBAMOL 750 MG: 750 TABLET ORAL at 09:45

## 2017-11-11 RX ADMIN — OXYCODONE HYDROCHLORIDE 20 MG: 10 TABLET ORAL at 06:41

## 2017-11-11 RX ADMIN — LORATADINE 10 MG: 10 TABLET ORAL at 09:01

## 2017-11-11 RX ADMIN — HYDROMORPHONE HYDROCHLORIDE 0.5 MG: 2 INJECTION INTRAMUSCULAR; INTRAVENOUS; SUBCUTANEOUS at 00:55

## 2017-11-11 RX ADMIN — HYDROMORPHONE HYDROCHLORIDE 1 MG: 1 INJECTION, SOLUTION INTRAMUSCULAR; INTRAVENOUS; SUBCUTANEOUS at 15:43

## 2017-11-11 RX ADMIN — OXYCODONE HYDROCHLORIDE 20 MG: 10 TABLET ORAL at 10:40

## 2017-11-11 RX ADMIN — METHOCARBAMOL 750 MG: 750 TABLET ORAL at 12:50

## 2017-11-11 RX ADMIN — METOCLOPRAMIDE HYDROCHLORIDE 10 MG: 10 TABLET ORAL at 05:40

## 2017-11-11 RX ADMIN — ANTACID TABLETS 500 MG: 500 TABLET, CHEWABLE ORAL at 09:01

## 2017-11-11 RX ADMIN — ANTACID TABLETS 500 MG: 500 TABLET, CHEWABLE ORAL at 19:45

## 2017-11-11 ASSESSMENT — COGNITIVE AND FUNCTIONAL STATUS - GENERAL
HELP NEEDED FOR BATHING: A LITTLE
TURNING FROM BACK TO SIDE WHILE IN FLAT BAD: A LITTLE
SUGGESTED CMS G CODE MODIFIER MOBILITY: CJ
MOVING TO AND FROM BED TO CHAIR: A LITTLE
SUGGESTED CMS G CODE MODIFIER DAILY ACTIVITY: CI
MOBILITY SCORE: 22
DAILY ACTIVITIY SCORE: 23

## 2017-11-11 ASSESSMENT — GAIT ASSESSMENTS
ASSISTIVE DEVICE: FRONT WHEEL WALKER
DISTANCE (FEET): 250
GAIT LEVEL OF ASSIST: MODIFIED INDEPENDENT

## 2017-11-11 ASSESSMENT — PAIN SCALES - GENERAL
PAINLEVEL_OUTOF10: 7
PAINLEVEL_OUTOF10: 9
PAINLEVEL_OUTOF10: 9
PAINLEVEL_OUTOF10: 8
PAINLEVEL_OUTOF10: 9
PAINLEVEL_OUTOF10: 8
PAINLEVEL_OUTOF10: 5
PAINLEVEL_OUTOF10: 7
PAINLEVEL_OUTOF10: 10
PAINLEVEL_OUTOF10: 10
PAINLEVEL_OUTOF10: 6
PAINLEVEL_OUTOF10: 10
PAINLEVEL_OUTOF10: 9
PAINLEVEL_OUTOF10: 7
PAINLEVEL_OUTOF10: 8
PAINLEVEL_OUTOF10: 6

## 2017-11-11 NOTE — PROGRESS NOTES
Patient resting quietly in bed, no S/S of distress, AA&Ox4. Denies SOB, chest pain, dizziness. Bed alarm on, call light within reach,  pt calls appropriately and does not get out of bed. Bed in lowest position, bed locked, RN and CNA numbers provided, no further needs at this time. No changes from EPIC. Hourly rounding in place. Waiting for SCD machine to come up from central supply.

## 2017-11-11 NOTE — THERAPY
"Physical Therapy Treatment completed.   Bed Mobility:  Supine to Sit: Modified Independent  Transfers: Sit to Stand: Supervised  Gait: Level Of Assist: Modified Independent with Front-Wheel Walker       Plan of Care: Patient with no further skilled PT needs in the acute care setting at this time  Discharge Recommendations: Equipment: Front-Wheel Walker. Post-acute therapy Discharge to home with outpatient or home health for additional skilled therapy services.     See \"Rehab Therapy-Acute\" Patient Summary Report for complete documentation.       "

## 2017-11-11 NOTE — THERAPY
"Occupational Therapy Treatment completed with focus on ADLs, ADL transfers and patient education.  Functional Status:  Pt seen for OT tx today following stage 2/2 lumbar sx. Pt was able to perform basic self-care tasks and functional t/f's with supervision/sba, aware how to obtain SC for EC and assist with managing spinal pxns. Pt does not present the need for further acute skilled OT services at this time and will d/c from services.   Plan of Care: Patient with no further skilled OT needs in the acute care setting at this time  Discharge Recommendations:  Equipment Front-Wheel Walker. Post-acute therapy Discharge to home with outpatient or home health for additional skilled therapy services.    See \"Rehab Therapy-Acute\" Patient Summary Report for complete documentation.   "

## 2017-11-11 NOTE — PROGRESS NOTES
Received report from night shift RN. Assumed care of patient. Pt assessed and stable. VSS. Patient reports 10/10 pain at this time.  Administered medication for pain.  Discussed plan of care for day with patient and received verbal understanding. Call light within reach, strip alarm active, bed in low position. Miralax administered.

## 2017-11-11 NOTE — CARE PLAN
Problem: Safety  Goal: Will remain free from injury  Outcome: PROGRESSING AS EXPECTED  Patient remains free from injury. Patient uses call light when necessary. Nursing staff assists with ambulation. Patient educated on injury prevention.     Problem: Infection  Goal: Will remain free from infection  Outcome: PROGRESSING AS EXPECTED  No S/Sx of infection. Hand hygiene performed before and after patient contact. Patient educated on hand hygiene and infection prevention.

## 2017-11-11 NOTE — CARE PLAN
Problem: Communication  Goal: The ability to communicate needs accurately and effectively will improve  Outcome: PROGRESSING AS EXPECTED      Problem: Venous Thromboembolism (VTW)/Deep Vein Thrombosis (DVT) Prevention:  Goal: Patient will participate in Venous Thrombosis (VTE)/Deep Vein Thrombosis (DVT)Prevention Measures  Outcome: PROGRESSING AS EXPECTED  Pt wearing scds.

## 2017-11-12 VITALS
SYSTOLIC BLOOD PRESSURE: 129 MMHG | BODY MASS INDEX: 25.53 KG/M2 | WEIGHT: 153.22 LBS | DIASTOLIC BLOOD PRESSURE: 90 MMHG | RESPIRATION RATE: 18 BRPM | HEIGHT: 65 IN | OXYGEN SATURATION: 90 % | HEART RATE: 108 BPM | TEMPERATURE: 97.1 F

## 2017-11-12 PROBLEM — M51.36 DEGENERATIVE DISC DISEASE, LUMBAR: Status: ACTIVE | Noted: 2017-11-12

## 2017-11-12 PROBLEM — M54.16 SPINAL STENOSIS OF LUMBAR REGION WITH RADICULOPATHY: Status: ACTIVE | Noted: 2017-11-12

## 2017-11-12 PROBLEM — M51.369 DEGENERATIVE DISC DISEASE, LUMBAR: Status: ACTIVE | Noted: 2017-11-12

## 2017-11-12 PROBLEM — M48.061 SPINAL STENOSIS OF LUMBAR REGION WITH RADICULOPATHY: Status: ACTIVE | Noted: 2017-11-12

## 2017-11-12 PROCEDURE — 700102 HCHG RX REV CODE 250 W/ 637 OVERRIDE(OP): Performed by: PHYSICIAN ASSISTANT

## 2017-11-12 PROCEDURE — 700112 HCHG RX REV CODE 229: Performed by: PHYSICIAN ASSISTANT

## 2017-11-12 PROCEDURE — A9270 NON-COVERED ITEM OR SERVICE: HCPCS | Performed by: PHYSICIAN ASSISTANT

## 2017-11-12 RX ORDER — METOCLOPRAMIDE 10 MG/1
10 TABLET ORAL
Qty: 21 TAB | Refills: 0 | Status: SHIPPED | OUTPATIENT
Start: 2017-11-12 | End: 2023-05-25

## 2017-11-12 RX ORDER — METHOCARBAMOL 750 MG/1
750 TABLET, FILM COATED ORAL 4 TIMES DAILY
Qty: 120 TAB | Refills: 0 | Status: SHIPPED | OUTPATIENT
Start: 2017-11-12

## 2017-11-12 RX ORDER — METHYLPREDNISOLONE 4 MG/1
TABLET ORAL
Qty: 1 KIT | Refills: 0 | Status: ON HOLD | OUTPATIENT
Start: 2017-11-12 | End: 2023-05-31

## 2017-11-12 RX ORDER — OXYCODONE HYDROCHLORIDE 10 MG/1
10 TABLET ORAL EVERY 4 HOURS PRN
Qty: 90 TAB | Refills: 0 | Status: ON HOLD | OUTPATIENT
Start: 2017-11-12 | End: 2023-05-31

## 2017-11-12 RX ADMIN — ANTACID TABLETS 500 MG: 500 TABLET, CHEWABLE ORAL at 09:20

## 2017-11-12 RX ADMIN — OXYCODONE HYDROCHLORIDE 20 MG: 10 TABLET ORAL at 04:56

## 2017-11-12 RX ADMIN — OXYCODONE HYDROCHLORIDE 20 MG: 10 TABLET ORAL at 10:27

## 2017-11-12 RX ADMIN — METHOCARBAMOL 750 MG: 750 TABLET ORAL at 12:43

## 2017-11-12 RX ADMIN — LORATADINE 10 MG: 10 TABLET ORAL at 09:20

## 2017-11-12 RX ADMIN — DOCUSATE SODIUM 100 MG: 100 CAPSULE ORAL at 09:20

## 2017-11-12 RX ADMIN — FLUTICASONE PROPIONATE 50 MCG: 50 SPRAY, METERED NASAL at 09:20

## 2017-11-12 RX ADMIN — METOCLOPRAMIDE HYDROCHLORIDE 10 MG: 10 TABLET ORAL at 06:03

## 2017-11-12 RX ADMIN — METHOCARBAMOL 750 MG: 750 TABLET ORAL at 09:20

## 2017-11-12 RX ADMIN — DIPHENHYDRAMINE HCL 25 MG: 25 TABLET ORAL at 04:57

## 2017-11-12 RX ADMIN — LISINOPRIL 20 MG: 20 TABLET ORAL at 09:20

## 2017-11-12 RX ADMIN — METOCLOPRAMIDE HYDROCHLORIDE 10 MG: 10 TABLET ORAL at 09:20

## 2017-11-12 ASSESSMENT — PAIN SCALES - GENERAL
PAINLEVEL_OUTOF10: 6
PAINLEVEL_OUTOF10: 10
PAINLEVEL_OUTOF10: 6
PAINLEVEL_OUTOF10: 6
PAINLEVEL_OUTOF10: 10

## 2017-11-12 NOTE — DISCHARGE INSTRUCTIONS
Discharge Instructions    Discharged to home by car with relative. Discharged via wheelchair, hospital escort: Yes.  Special equipment needed: Not Applicable    Be sure to schedule a follow-up appointment with your primary care doctor or any specialists as instructed.     Discharge Plan:   Diet Plan: Discussed  Activity Level: Discussed  Confirmed Follow up Appointment: Patient to Call and Schedule Appointment  Confirmed Symptoms Management: Discussed  Medication Reconciliation Updated: Yes  Influenza Vaccine Indication: Patient Refuses    I understand that a diet low in cholesterol, fat, and sodium is recommended for good health. Unless I have been given specific instructions below for another diet, I accept this instruction as my diet prescription.   Other diet: regular    Special Instructions: None    · Is patient discharged on Warfarin / Coumadin?   No     · Is patient Post Blood Transfusion?  No    Depression / Suicide Risk    As you are discharged from this [facility] facility, it is important to learn how to keep safe from harming yourself.    Recognize the warning signs:  · Abrupt changes in personality, positive or negative- including increase in energy   · Giving away possessions  · Change in eating patterns- significant weight changes-  positive or negative  · Change in sleeping patterns- unable to sleep or sleeping all the time   · Unwillingness or inability to communicate  · Depression  · Unusual sadness, discouragement and loneliness  · Talk of wanting to die  · Neglect of personal appearance   · Rebelliousness- reckless behavior  · Withdrawal from people/activities they love  · Confusion- inability to concentrate     If you or a loved one observes any of these behaviors or has concerns about self-harm, here's what you can do:  · Talk about it- your feelings and reasons for harming yourself  · Remove any means that you might use to hurt yourself (examples: pills, rope, extension cords,  firearm)  · Get professional help from the community (Mental Health, Substance Abuse, psychological counseling)  · Do not be alone:Call your Safe Contact- someone whom you trust who will be there for you.  · Call your local CRISIS HOTLINE 533-7974 or 466-095-7978  · Call your local Children's Mobile Crisis Response Team [place] (396) 687-1126 or www.[employer]  · Call the toll free National Suicide Prevention Hotlines   · National Suicide Prevention Lifeline 816-357-ALUR (6162)  · National Hope Line Network 800-SUICIDE (585-7736)

## 2017-11-12 NOTE — CARE PLAN
Problem: Venous Thromboembolism (VTW)/Deep Vein Thrombosis (DVT) Prevention:  Goal: Patient will participate in Venous Thrombosis (VTE)/Deep Vein Thrombosis (DVT)Prevention Measures  Outcome: PROGRESSING AS EXPECTED  Pt wearing scds and ambulating.     Problem: Pain Management  Goal: Pain level will decrease to patient's comfort goal  Outcome: PROGRESSING SLOWER THAN EXPECTED  Pt complains of constant 7-10/10 pain with current pain management program.

## 2017-11-12 NOTE — PROGRESS NOTES
FW walker was delivered and fitted to patient.  If any further assistance needed, please call extension [phone] or place order for Ortho Technician assistance as a communication order in [employer].

## 2017-11-12 NOTE — PROGRESS NOTES
Neurosurgery Progress Note    Subjective:  POD#4 L5-S1 ALIF   POD#2 L5-S1  Lami/PSF  Expected postsurgical back pain, and baseline right hip pain soreness, otherwise no new pain/ paresthesias in BLEs  Expected minimal soreness in abdomen along incision site  Drain output 25cc/8hr  Passing flatus, no Bm, feels constipated  Voiding without issue  PT/OT recs FWW and d/c home vs home health PT    Exam:  Awake, alert, oriented  Motor: 5/5 BLEs  Sensory grossly intact in BLEs      BP  Min: 103/71  Max: 137/88  Pulse  Av.8  Min: 108  Max: 123  Resp  Av  Min: 14  Max: 18  Temp  Av.7 °C (98 °F)  Min: 36.2 °C (97.1 °F)  Max: 37.1 °C (98.7 °F)  SpO2  Av.3 %  Min: 90 %  Max: 98 %    No Data Recorded    Recent Labs      11/10/17   0410   WBC  4.6*   RBC  2.68*   HEMOGLOBIN  9.7*   HEMATOCRIT  28.2*   MCV  105.2*   MCH  36.2*   MCHC  34.4   RDW  45.5   PLATELETCT  175   MPV  8.8*     Recent Labs      11/10/17   0410   SODIUM  135   POTASSIUM  4.3   CHLORIDE  104   CO2  23   GLUCOSE  94   BUN  6*               Intake/Output       17 07 - 17 0659 17 0700 - 17 0659      [id number] 4792-2659 Total 2525-9111 2870-0823 Total       Intake    Total Intake -- -- -- -- -- --       Output    Urine  --  -- --  --  -- --    Number of Times Voided -- 2 x 2 x -- -- --    Drains  60  60 120  --  -- --    Hemovac 1 60 60 120 -- -- --    Stool  --  -- --  --  -- --    Number of Times Stooled 0 x -- 0 x -- -- --    Total Output 60 60 120 -- -- --       Net I/O     -60 -60 -120 -- -- --            Intake/Output Summary (Last 24 hours) at 17 1017  Last data filed at 17 0500   Gross per 24 hour   Intake                0 ml   Output              120 ml   Net             -120 ml            • methocarbamol  750 mg 4X/DAY   • hydromorphone  1 mg Q3HRS PRN   • Pharmacy Consult Request  1 Each PRN   • MD ALERT...Do not administer NSAIDS or ASPIRIN unless ORDERED By Neurosurgery  1 Each PRN   • 0.9 %  NaCl with KCl 20 mEq 1,000 mL   Continuous   • metoclopramide  10 mg TID AC   • oxycodone immediate-release  10 mg Q4HRS PRN    Or   • oxycodone immediate-release  20 mg Q4HRS PRN   • fluticasone  1 Spray DAILY   • lisinopril  20 mg DAILY   • loratadine  10 mg DAILY   • Pharmacy Consult Request  1 Each PRN   • MD ALERT...Do not administer NSAIDS or ASPIRIN unless ORDERED By Neurosurgery  1 Each PRN   • docusate sodium  100 mg BID   • senna-docusate  1 Tab Nightly   • senna-docusate  1 Tab Q24HRS PRN   • polyethylene glycol/lytes  1 Packet BID PRN   • magnesium hydroxide  30 mL QDAY PRN   • bisacodyl  10 mg Q24HRS PRN   • fleet  1 Each Once PRN   • dextrose 5 % and 0.45 % NaCl with KCl 20 mEq   Continuous   • diphenhydrAMINE  25 mg Q6HRS PRN    Or   • diphenhydrAMINE  25 mg Q6HRS PRN   • ondansetron  4 mg Q4HRS PRN   • ondansetron  4 mg Q4HRS PRN   • promethazine  12.5-25 mg Q4HRS PRN   • promethazine  12.5-25 mg Q4HRS PRN   • prochlorperazine  5-10 mg Q4HRS PRN   • alprazolam  0.25 mg BID PRN   • labetalol  10 mg Q HOUR PRN   • benzocaine-menthol  1 Lozenge Q2HRS PRN   • calcium carbonate  500 mg BID   • hydrocodone/acetaminophen  1-2 Tab Q4HRS PRN   • oxycodone-acetaminophen  1-2 Tab Q4HRS PRN       Assessment and Plan:    POD #4 L5-S1 ALIF  POD# 2 L5-S1 lami/PSF  D/c drain  Ordered FWW and Home Health PT   D/c home today

## 2017-11-12 NOTE — OP REPORT
DATE OF SERVICE:  11/10/2017    PREOPERATIVE DIAGNOSES:  1.  L5-S1 degenerative disc disease.  2.  L5-S1 grade II spondylolisthesis.  3.  Bilateral L5 pars defects.  4.  Bilateral L5-S1 radiculopathies.  5.  Status post stage I, L5-S1 ALIF procedure.    POSTOPERATIVE DIAGNOSES:  1.  L5-S1 degenerative disc disease.  2.  L5-S1 grade II spondylolisthesis.  3.  Bilateral L5 pars defects  4.  Bilateral L5-S1 radiculopathies.  5.  Status post stage I L5-S1 ALIF procedure.    PROCEDURES:  Stage II of a planned II stage procedure for anterior- posterior,   circumferential decompression and fusion at L5-S1 level.  1.  Bilateral L5 laminotomies and foraminotomies, decompression bilateral L5   nerve roots.  2.  Bilateral S1 laminotomies and foraminotomies, decompression bilateral S1   nerve roots.  3.  Left L5 transpedicular approach far lateral decompression left L5 nerve   root.  4.  Left S1 transpedicular approach far lateral decompression left S1 nerve   root.  5.  L5-S1 posterior lateral arthrodesis.  6.  L5-S1 RTI pedicle screws.  7.  Microscopic microdissection of spinal canal.  8.  SSEP, EMG monitoring and intraoperative screw stimulation, all performed   by neuro monitoring associates, which remained stable throughout.    SURGEON:  Jrarell Vega MD, neurosurgery-spine surgery.    ASSISTANT:  Mil Munroe PA-C    ANESTHESIA:  General endotracheal.    COMPLICATIONS:  None.    ESTIMATED BLOOD LOSS:  Less than 100 mL    PREOPERATIVE NOTE:  This is an extremely pleasant 47-year-old lady who   presents with chronic low back pain and bilateral lower extremity radicular   leg pain, weakness and paresthesia consistent with bilateral L5-S1   radiculopathies.  MRI showed plain x-rays and CT scan showed bilateral pars   defects at L5 with grade II spondylolisthesis and severe foraminal stenosis.    The patient has failed extensive conservative care.  Patient after undergoing   stage I of a planned II-stage procedure with  L5-S1 ALIF with excellent   restoration of her lordosis and correction of her spondylolisthesis.  Patient   returns today for planned stage II procedure for L5-S1 decompression and   instrumented stabilization.    PLAN:  I discussed surgical procedure, alternatives, goals, risks and   benefits, complications in detail with the patient, used a bone model, MRI and   educational handouts to assist the patient with decision making, answered all   questions to the best of my ability.  Patient understood and consented to the   surgery.    NARRATIVE DICTATION:  Patient was brought to the operating room and placed   under general endotracheal anesthesia.  She was placed prone on the operating   OSI table with care taken about the bony prominences and peripheral nerves.    Lumbar region was prepped and draped in the usual sterile fashion.  Following   localization with cross table fluoroscopy, a midline incision was made from   L5-S1, the dorsal elements of L5 to S1 were exposed in a subperiosteal fashion   out to the facets bilaterally.  Self-retaining retraction applied.    Intraoperative x-ray confirmed appropriate levels.  The transverse processes   of L5 and S1 were exposed bilaterally.    Using a double action rongeur, Kerrison rongeurs and curettes, bilateral L5   laminotomies and foraminotomies completed, bilateral S1 laminotomies and   foraminotomies were completed.  Marked facet and ligamentous hypertrophy was   undercut and removed throughout.  There was considerable stenosis on the left   side given the amount of collapse the patient had.  Hence I drilled down the   left L5 pedicle and left S1 pedicle for transpedicular decompression of left   L5-S1 nerve root complexes.  The wound was irrigated with bacitracin   irrigation.  Immaculate hemostasis achieved.  I then placed pedicle screws in   the L5-S1 pedicles bilaterally using RTI system and using fluoroscopy and   PediGuard to cannulate the screws in perfect  position.  I stimulated the   screws.  They all stimulated over 20 millivolts.  Two rods were placed over   the polyaxial screw heads, locking screws applied and secured and final   tightened.  I then decorticated the transverse processes and packed local   morcellized autograft with DBM for posterolateral arthrodesis at L5-S1.    I placed an epidural catheter with Marcaine and fentanyl for postoperative   analgesia.  I infiltrated the muscle with Marcaine analgesia and then I closed   the wound over a drain, brought through a separate incision with 0 Vicryl   deep fascia, 2-0 Vicryl deep dermal layer, Steri-Strips to skin.  Small   sterile dressing was applied.  Swabs, needles, and instruments correct by 2   count.  No complications were encountered.  Patient tolerated the procedure,   was stable and transferred to recovery room.  Patient will be observed at   [facility] until she meets discharge criteria.    INTRAOPERATIVE FINDINGS:  Severe stenosis at L5-S1 bilaterally, which was   freed up via circumferential decompression and stabilization.  Patient was   neurologically intact in the recovery room.     Patient will follow up at [facility] in 2   weeks and 6 weeks' time as arranged.       ____________________________________     MD KEYLA VILLEGAS / OLEG    DD:  11/11/2017 20:57:13  DT:  11/11/2017 22:48:06    D#:  [id number]  Job#:  [id number]    cc: ANTIONETTE NUÑEZ MD

## 2017-11-12 NOTE — FACE TO FACE
Face to Face Note  -  Durable Medical Equipment    Karen Pino P.A.-C. - NPI: [id number]  I certify that this patient is under my care and that they have had a durable medical equipment(DME)face to face encounter by myself that meets the physician DME face-to-face encounter requirements with this patient on:    Date of encounter:   Patient:                    MRN:                       YOB: 2018  Justina Bustosient  [id number]  1970     The encounter with the patient was in whole, or in part, for the following medical condition, which is the primary reason for durable medical equipment:  Post-Op Surgery    I certify that, based on my findings, the following durable medical equipment is medically necessary:  Walkers.    HOME O2 Saturation Measurements:(Values must be present for Home Oxygen orders)         ,     ,         My Clinical findings support the need for the above equipment due to:  Abnormal Gait    Supporting Symptoms: gait instability/ falls risk post-lumbar surgery     ------------------------------------------------------------------------------------------------------------------    Face to Face Supporting Documentation - Home Health    The encounter with this patient was in whole or in part the primary reason for home health admission.    Date of encounter:   Patient:                    MRN:                       YOB: 2018  Justina STEEL Bedient  [id number]  1970     Home health to see patient for:  Physical Therapy evaluation and treatment    Skilled need for:  Comment: no skilled nursing needed     Skilled nursing interventions to include:  Comment: PT only, no nursing needed    Homebound evidenced status by:  Need the aid of supportive devices such as crutches, canes, wheelchairs or walkers. Leaving home must require a considerable and taxing effort. There must exist a normal inability to leave the home.    Community Physician to provide follow up care:  Preeti Medina M.D.     Optional Interventions    Wound information & treatment:    Home Infusion Therapy orders:    Line/Drain/Airway:    I certify the face to face encounter for this home care referral meets the CMS requirements and the encounter/clinical assessment with the patient was, in whole, or in part, for the medical condition(s) listed above, which is the primary reason for home health care. Based on my clinical findings: the service(s) are medically necessary, support the need for home health care, and the homebound criteria are met.  I certify that this patient has had a face to face encounter by myself.  Karen Pino P.A.-C. - NPI: [id number]    *Debility, frailty and advanced age in the absence of an acute deterioration or exacerbation of a condition do not qualify a patient for home health.

## 2017-11-12 NOTE — CARE PLAN
Problem: Bowel/Gastric:  Goal: Normal bowel function is maintained or improved  Outcome: PROGRESSING SLOWER THAN EXPECTED  Patient still has not had a bowel movement since she's been here. Patient reports gas but no bowel movement. RN supplied patient with milk of magnesia.    Problem: Mobility  Goal: Risk for activity intolerance will decrease  Outcome: PROGRESSING AS EXPECTED  Patient ambulates safely to and from the bathroom with no assistance. No falls. Treaded socks and bed alarm in place.

## 2017-11-12 NOTE — DISCHARGE SUMMARY
CHIEF COMPLAINT ON ADMISSION  No chief complaint on file.      CODE STATUS  Full Code    HPI & HOSPITAL COURSE  This is a 47 y.o. female with chronic and progressive low back pain. Patient underwent separate day staged procedures: Stage I L5-S1 anterior lumbar interbody fusion on 11/7/2017.  The procedure went without complications. Patient reported improvement in strength and pain in lower extremities after surgery.  On 11/10/2017, patient underwent a Stage II L5-S1 lumbar laminectomy/ diskectomy with posterior fusion for stabilization of L5-S1 spondylolisthesis and for decompression of bilateral foraminal stenosis.  Surgery went well without complications. On POD 1 from Stage II procedure, patient c/o back pain, but overall tolerable after pain medication adjustments.  On this POD 2 from Stage II procedure, patient reports back pain and right hip pain similar to baseline, but denies new or worsening lower extremity radiculopathy.  Surgical drain was removed on this POD2. Patient passed flatus, voiding without issue, denied new symptoms such as nausea, vomiting, abdominal distension. She did c/o constipation but found Reglan helpful.  Patient did not have a bowel movement.  She was evaluated by PT/OT during hospitalization course.  She was ambulatory with FWW.    Patient met all discharge criteria and is medically stable for discharge to home with FWW and home health Pt.  She was discharged with the following medications:  1) oxycodone 10 mg, 1 tab PO q4hr PRN pain #90  2) robaxin 750 mg, 1 tab po QID PRN muscle spasm #120  3) magnesium citrate, take 200 ml once for constipation #1 bottle  4) reglan 10 mg, 1 tab PO TID before meals #21   5) medrol dosepak 4 mg, take as instructed on label, #1 dosepak    Therefore, she is discharged in good and stable condition with close outpatient follow-up.    SPECIFIC OUTPATIENT FOLLOW-UP  [facility]    DISCHARGE PROBLEM LIST  Active Problems:    Degenerative disc  disease, lumbar POA: Unknown    Spinal stenosis of lumbar region with radiculopathy POA: Unknown  Resolved Problems:    * No resolved hospital problems. *      FOLLOW UP  No future appointments.  No follow-up provider specified.    MEDICATIONS ON DISCHARGE   Justina Melo   Home Medication Instructions NAHEED:[id number]    Printed on:11/12/17 1029   Medication Information                      acetaminophen (TYLENOL) 500 MG Tab  Take 1,000 mg by mouth 2 times a day as needed.             Fluticasone Propionate (FLONASE NA)  Spray 1 Spray in nose every day.             lisinopril (PRINIVIL) 20 MG Tab  Take 20 mg by mouth every day.             loratadine (CLARITIN) 10 MG Tab  Take 10 mg by mouth every day.             magnesium citrate Solution  Take 200 mL by mouth Once for 1 dose.             methocarbamol (ROBAXIN) 750 MG Tab  Take 1 Tab by mouth 4 times a day.             MethylPREDNISolone (MEDROL DOSEPAK) 4 MG Tablet Therapy Pack  Take as instructed on box             metoclopramide (REGLAN) 10 MG Tab  Take 1 Tab by mouth 3 times a day before meals.             oxycodone immediate release (ROXICODONE) 10 MG immediate release tablet  Take 1 Tab by mouth every four hours as needed for Moderate Pain or Severe Pain.             Pseudoephedrine HCl (SUDAFED 24 HOUR PO)  Take 1 Tab by mouth every day.             tramadol (ULTRAM) 50 MG Tab  Take 50 mg by mouth 2 times a day as needed.                 DIET  Orders Placed This Encounter   Procedures   • DIET ORDER     Standing Status:   Standing     Number of Occurrences:   1     Order Specific Question:   Diet:     Answer:   Regular [1]       ACTIVITY  As tolerated.  Weight bearing as tolerated      CONSULTATIONS  none    PROCEDURES  See HPI    LABORATORY  Lab Results   Component Value Date/Time    SODIUM 135 11/10/2017 04:10 AM    POTASSIUM 4.3 11/10/2017 04:10 AM    CHLORIDE 104 11/10/2017 04:10 AM    CO2 23 11/10/2017 04:10 AM    GLUCOSE 94 11/10/2017 04:10 AM    BUN  6 (L) 11/10/2017 04:10 AM    CREATININE 0.63 11/10/2017 04:10 AM        Lab Results   Component Value Date/Time    WBC 4.6 (L) 11/10/2017 04:10 AM    HEMOGLOBIN 9.7 (L) 11/10/2017 04:10 AM    HEMATOCRIT 28.2 (L) 11/10/2017 04:10 AM    PLATELETCT 175 11/10/2017 04:10 AM        Total time of the discharge process exceeds 31 minutes

## 2018-10-04 ENCOUNTER — APPOINTMENT (OUTPATIENT)
Dept: PAIN MANAGEMENT | Facility: REHABILITATION | Age: 48
End: 2018-10-04
Attending: PHYSICAL MEDICINE & REHABILITATION
Payer: MEDICAID

## 2018-11-01 ENCOUNTER — APPOINTMENT (OUTPATIENT)
Dept: PAIN MANAGEMENT | Facility: REHABILITATION | Age: 48
End: 2018-11-01
Attending: PHYSICAL MEDICINE & REHABILITATION
Payer: MEDICAID

## 2018-11-15 ENCOUNTER — HOSPITAL ENCOUNTER (OUTPATIENT)
Dept: PAIN MANAGEMENT | Facility: REHABILITATION | Age: 48
End: 2018-11-15
Attending: PHYSICAL MEDICINE & REHABILITATION
Payer: MEDICAID

## 2022-10-22 ENCOUNTER — APPOINTMENT (OUTPATIENT)
Dept: RADIOLOGY | Facility: MEDICAL CENTER | Age: 52
End: 2022-10-22
Attending: EMERGENCY MEDICINE
Payer: MEDICAID

## 2022-10-22 ENCOUNTER — HOSPITAL ENCOUNTER (EMERGENCY)
Facility: MEDICAL CENTER | Age: 52
End: 2022-10-22
Attending: EMERGENCY MEDICINE
Payer: MEDICAID

## 2022-10-22 VITALS
BODY MASS INDEX: 27.63 KG/M2 | TEMPERATURE: 97.5 F | HEART RATE: 105 BPM | DIASTOLIC BLOOD PRESSURE: 122 MMHG | WEIGHT: 161.82 LBS | HEIGHT: 64 IN | RESPIRATION RATE: 16 BRPM | OXYGEN SATURATION: 99 % | SYSTOLIC BLOOD PRESSURE: 164 MMHG

## 2022-10-22 DIAGNOSIS — M54.50 ACUTE BILATERAL LOW BACK PAIN WITHOUT SCIATICA: ICD-10-CM

## 2022-10-22 PROCEDURE — 99284 EMERGENCY DEPT VISIT MOD MDM: CPT

## 2022-10-22 PROCEDURE — 72100 X-RAY EXAM L-S SPINE 2/3 VWS: CPT

## 2022-10-22 RX ORDER — OXYCODONE HYDROCHLORIDE AND ACETAMINOPHEN 5; 325 MG/1; MG/1
1 TABLET ORAL EVERY 4 HOURS PRN
Qty: 10 TABLET | Refills: 0 | Status: SHIPPED | OUTPATIENT
Start: 2022-10-22 | End: 2022-10-25

## 2022-10-22 RX ORDER — IBUPROFEN 800 MG/1
800 TABLET ORAL EVERY 8 HOURS PRN
Qty: 30 TABLET | Refills: 0 | Status: ON HOLD | OUTPATIENT
Start: 2022-10-22 | End: 2023-05-31

## 2022-10-22 RX ORDER — GABAPENTIN 300 MG/1
300 CAPSULE ORAL 3 TIMES DAILY
Qty: 90 CAPSULE | Refills: 0 | Status: SHIPPED | OUTPATIENT
Start: 2022-10-22

## 2022-10-22 NOTE — ED PROVIDER NOTES
ED Provider Note    CHIEF COMPLAINT  Chief Complaint   Patient presents with    Fall     Fall after moving one month ago and then fell down stairs one week ago. Pain to bilateral low back.        HPI  Justina Melo is a 52 y.o. female who presents the ED with complaints of lower back pain.  The patient apparently fell about a month ago and since then has been moving at home and then fell again about a week ago.  She is complaining of continued pain primarily bilateral lower aspect of her spine with radiation into her upper buttock region bilaterally.  The patient denies any loss of bowel or bladder function denies any loss of sensation down the legs denies any weakness.  The patient presents here today just for evaluation.  Patient has had surgery on her lower back by [facility] [place].  The patient had attempted to contact [facility] but was unable to get in for evaluation and is here for evaluation.  Patient denies any other symptoms presents for evaluation.    REVIEW OF SYSTEMS  See HPI for further details. All other systems are negative.     PAST MEDICAL HISTORY  Past Medical History:   Diagnosis Date    Arthritis     Back    Hiatus hernia syndrome     Hypertension     Pain 2017    Back.       FAMILY HISTORY  History reviewed. No pertinent family history.  Patient's family history has been discussed and is been found to be noncontributory to his present illness  SOCIAL HISTORY  Social History     Socioeconomic History    Marital status:    Tobacco Use    Smoking status: Former     Packs/day: 0.25     Years: 20.00     Pack years: 5.00     Types: Cigarettes     Quit date: 2015     Years since quittin.8    Smokeless tobacco: Never   Vaping Use    Vaping Use: Never used   Substance and Sexual Activity    Alcohol use: Yes     Comment: 3-4/week    Drug use: No     Comment: abimael Medina M.D.      SURGICAL HISTORY  Past Surgical History:   Procedure Laterality Date    FUSION,  SPINE, LUMBAR, PLIF  11/10/2017    Procedure: LUMBAR FUSION POSTERIOR- STAGE #2 L5-S1;  Surgeon: Jarrell Vega M.D.;  Location: SURGERY [facility];  Service: Neurosurgery    LUMBAR LAMINECTOMY DISKECTOMY  11/10/2017    Procedure: LUMBAR LAMINECTOMY DISKECTOMY;  Surgeon: Jarrell Vega M.D.;  Location: SURGERY [facility];  Service: Neurosurgery    LUMBAR FUSION ANTERIOR  11/7/2017    Procedure: LUMBAR FUSION ANTERIOR- STAGE #1 L5-S1 ALIF;  Surgeon: Jarrell Vega M.D.;  Location: SURGERY [facility];  Service: Neurosurgery    GYN SURGERY      c section 2004    OTHER ORTHOPEDIC SURGERY      back surgery 1985 rivas rods       CURRENT MEDICATIONS  Home Medications    **Home medications have not yet been reviewed for this encounter**       No current facility-administered medications on file prior to encounter.     Current Outpatient Medications on File Prior to Encounter   Medication Sig Dispense Refill    oxycodone immediate release (ROXICODONE) 10 MG immediate release tablet Take 1 Tab by mouth every four hours as needed for Moderate Pain or Severe Pain. 90 Tab 0    metoclopramide (REGLAN) 10 MG Tab Take 1 Tab by mouth 3 times a day before meals. 21 Tab 0    methocarbamol (ROBAXIN) 750 MG Tab Take 1 Tab by mouth 4 times a day. 120 Tab 0    MethylPREDNISolone (MEDROL DOSEPAK) 4 MG Tablet Therapy Pack Take as instructed on box 1 Kit 0    tramadol (ULTRAM) 50 MG Tab Take 50 mg by mouth 2 times a day as needed.      acetaminophen (TYLENOL) 500 MG Tab Take 1,000 mg by mouth 2 times a day as needed.      lisinopril (PRINIVIL) 20 MG Tab Take 20 mg by mouth every day.      Fluticasone Propionate (FLONASE NA) Spray 1 Spray in nose every day.      loratadine (CLARITIN) 10 MG Tab Take 10 mg by mouth every day.      Pseudoephedrine HCl (SUDAFED 24 HOUR PO) Take 1 Tab by mouth every day.           ALLERGIES  No Known Allergies    PHYSICAL EXAM  VITAL SIGNS: BP (!) 164/122   Pulse (!) 105   Temp 36.4 °C (97.5  "°F) (Temporal)   Resp 16   Ht 1.626 m (5' 4\")   Wt 73.4 kg (161 lb 13.1 oz)   LMP 10/21/2012   SpO2 99%   BMI 27.78 kg/m²    Pulse Ox interpretation nonhypoxic    Constitutional: Well developed, Well nourished, No acute distress, Non-toxic appearance.   Neck: Nontender midline good range of motion  Back: Nontender midline does have well-healed surgical incisions.  She is tender about the paraspinous musculature into the upper buttock region.  She is able to flex standing to about 90 degrees and has full extension.  Musculoskeletal: Good range of motion in all major joints.  5 or 5 strength  Neurologic: Alert & oriented x 3, DTRs are 2+ and equal in the lower extremities has normal distal sensation.  Has 5 or 5 strength in all distributions  Psychiatric: Affect normal, Judgment normal, Mood normal.       RADIOLOGY/PROCEDURES  DX-LUMBAR SPINE-2 OR 3 VIEWS   Final Result      1.  No fracture or traumatic listhesis.   2.  Sequela of L5-S1 instrumentation, with no evidence of hardware complication.   3.  Partially visualized sequela of thoracolumbar instrumentation.            COURSE & MEDICAL DECISION MAKING  Pertinent Labs & Imaging studies reviewed. (See chart for details)  Patient presents for evaluation.  Clinically she presents today because she was also concerned that there was a clicking sensation that she noticed and there may have broken some of her hardware x-ray was obtained there is no signs of hardware abnormalities and no signs of overt fractures.  At this point I will start the patient on ibuprofen she stated that gabapentin did help her in the past as well as 10 pills of Percocet.  Patient is attempting to get into see [facility].  She has been seen in the past at [facility].  Recommended for her to follow-up with her primary care physician or spine specialist for physical therapy referral return as needed.    FINAL IMPRESSION  1. Acute bilateral low back pain without sciatica  ibuprofen " (MOTRIN) 800 MG Tab    gabapentin (NEURONTIN) 300 MG Cap    oxyCODONE-acetaminophen (PERCOCET) 5-325 MG Tab            I reviewed prescription monitoring program for patient's narcotic use before prescribing a scheduled drug.The patient will not drink alcohol nor drive with prescribed medications      In prescribing controlled substances to this patient, I certify that I have obtained and reviewed the medical history this patient I have also made a good allison effort to obtain applicable records from other providers who have treated the patient and records did not demonstrate any increased risk of substance abuse that would prevent me from prescribing controlled substances.     I have conducted a physical exam and documented it. I have reviewed Ms. Melo’s prescription history as maintained by the [place] Prescription Monitoring Program.     I have assessed the patient’s risk for abuse, dependency, and addiction using the validated Opioid Risk Tool available at https://www.mdcalc.com/ycoteb-vcls-utcz-ort-narcotic-abuse.     Given the above, I believe the benefits of controlled substance therapy outweigh the risks. The reasons for prescribing controlled substances include in my professional opinion, controlled substances are a reasonable choice for this patient. Accordingly, I have discussed the risk and benefits, treatment plan, and alternative therapies with the patient. The patient has been consented for the medication and understands the risks.   The patient will return for new or worsening symptoms and is stable at the time of discharge.    The patient is referred to a primary physician for blood pressure management, diabetic screening, and for all other preventative health concerns.        DISPOSITION:  Patient will be discharged home in stable condition.    FOLLOW UP:  Preeti Medina M.D.  [address] [place] [address] [phone]    Schedule an appointment as soon as possible for a visit   For  re-check, Return if any symptoms worsen      OUTPATIENT MEDICATIONS:  New Prescriptions    GABAPENTIN (NEURONTIN) 300 MG CAP    Take 1 Capsule by mouth 3 times a day.    IBUPROFEN (MOTRIN) 800 MG TAB    Take 1 Tablet by mouth every 8 hours as needed for Moderate Pain.    OXYCODONE-ACETAMINOPHEN (PERCOCET) 5-325 MG TAB    Take 1 Tablet by mouth every four hours as needed for Severe Pain for up to 3 days.         Electronically signed by: Rodríguez Johnson M.D., 10/22/2022 1:37 PM

## 2022-10-22 NOTE — ED NOTES
Pt given d/c paperwork and RX p/u information, pt verbalized understanding all information given. Pt ambulated out of the ER w/o difficulty

## 2022-12-28 ENCOUNTER — OFFICE VISIT (OUTPATIENT)
Dept: MEDICAL GROUP | Facility: CLINIC | Age: 52
End: 2022-12-28
Payer: MEDICAID

## 2022-12-28 VITALS
HEIGHT: 65 IN | OXYGEN SATURATION: 95 % | RESPIRATION RATE: 18 BRPM | WEIGHT: 161 LBS | HEART RATE: 100 BPM | BODY MASS INDEX: 26.82 KG/M2 | DIASTOLIC BLOOD PRESSURE: 96 MMHG | TEMPERATURE: 98.7 F | SYSTOLIC BLOOD PRESSURE: 142 MMHG

## 2022-12-28 DIAGNOSIS — E66.3 OVERWEIGHT (BMI 25.0-29.9): ICD-10-CM

## 2022-12-28 DIAGNOSIS — Z12.39 ENCOUNTER FOR SCREENING FOR MALIGNANT NEOPLASM OF BREAST, UNSPECIFIED SCREENING MODALITY: ICD-10-CM

## 2022-12-28 DIAGNOSIS — K44.9 HIATAL HERNIA: ICD-10-CM

## 2022-12-28 DIAGNOSIS — Z12.11 SCREENING FOR COLORECTAL CANCER: ICD-10-CM

## 2022-12-28 DIAGNOSIS — M54.16 SPINAL STENOSIS OF LUMBAR REGION WITH RADICULOPATHY: ICD-10-CM

## 2022-12-28 DIAGNOSIS — Z80.0 FAMILY HISTORY OF COLON CANCER: ICD-10-CM

## 2022-12-28 DIAGNOSIS — Z12.31 ENCOUNTER FOR SCREENING MAMMOGRAM FOR BREAST CANCER: ICD-10-CM

## 2022-12-28 DIAGNOSIS — M48.061 SPINAL STENOSIS OF LUMBAR REGION WITH RADICULOPATHY: ICD-10-CM

## 2022-12-28 DIAGNOSIS — M51.36 DEGENERATIVE DISC DISEASE, LUMBAR: ICD-10-CM

## 2022-12-28 DIAGNOSIS — I10 PRIMARY HYPERTENSION: ICD-10-CM

## 2022-12-28 DIAGNOSIS — Z12.12 SCREENING FOR COLORECTAL CANCER: ICD-10-CM

## 2022-12-28 DIAGNOSIS — Z98.890 HISTORY OF SPINAL SURGERY: ICD-10-CM

## 2022-12-28 PROCEDURE — 99214 OFFICE O/P EST MOD 30 MIN: CPT | Mod: GC | Performed by: BEHAVIOR ANALYST

## 2022-12-28 RX ORDER — LISINOPRIL 20 MG/1
20 TABLET ORAL DAILY
Qty: 30 TABLET | Refills: 1 | Status: SHIPPED
Start: 2022-12-28 | End: 2023-05-25

## 2022-12-28 ASSESSMENT — PATIENT HEALTH QUESTIONNAIRE - PHQ9: CLINICAL INTERPRETATION OF PHQ2 SCORE: 0

## 2022-12-28 NOTE — PROGRESS NOTES
Subjective:     CC: Establish care    HPI:   Justina presents today with past medical history of scoliosis status post back surgery with cleo placement in 1985, herniated disc and degeneration status post prosthetic placement in 2017, hiatal hernia diagnosed 15 years ago complicated by GERD, hypertension who presents to establish care.    Patient also states that she fell back in September while moving up here to [place], she lost some balance and fell on her tailbone which is better when sitting, worse with standing.  Her ED x-ray at that time cleared her.  Patient however given her extensive spinal surgery history is requesting neurosurgical referral.  Patient taking gabapentin, and NSAIDs 800 mg (recently frequently) due to back pain.    Patient also states that she has a history of hiatal hernia that she has been needing to get checked out for the last 15 years, in the last few years she has had dysphagia in the lower third of her esophagus which is accompanied by epigastric pain and bloating.  Patient requesting endoscopy GI referral.  Patient states that she takes Prilosec for for 1 to 2 days every few weeks, patient educated that Prilosec must be a continued consistent medication.    Patient also has a history of hypertension and used to take lisinopril prior to COVID pandemic, given that healthcare access was difficult patient altogether stopped taking lisinopril.  Blood pressure today is 142/96.    Patient's mother has a family history of colon cancer, she had been diagnosed in her late 50s, patient at this time requesting colon cancer screening.    Patient also requesting breast cancer screening.    Patient has no shortness of breath no headache, no chest pain, no swelling, nausea, vomiting, diarrhea, constipation.    No problems updated.    Current Outpatient Medications Ordered in Epic   Medication Sig Dispense Refill    lisinopril (PRINIVIL) 20 MG Tab Take 1 Tablet by mouth every day. 30 Tablet 1    ibuprofen  "(MOTRIN) 800 MG Tab Take 1 Tablet by mouth every 8 hours as needed for Moderate Pain. 30 Tablet 0    gabapentin (NEURONTIN) 300 MG Cap Take 1 Capsule by mouth 3 times a day. 90 Capsule 0    methocarbamol (ROBAXIN) 750 MG Tab Take 1 Tab by mouth 4 times a day. 120 Tab 0    MethylPREDNISolone (MEDROL DOSEPAK) 4 MG Tablet Therapy Pack Take as instructed on box 1 Kit 0    oxycodone immediate release (ROXICODONE) 10 MG immediate release tablet Take 1 Tab by mouth every four hours as needed for Moderate Pain or Severe Pain. 90 Tab 0    metoclopramide (REGLAN) 10 MG Tab Take 1 Tab by mouth 3 times a day before meals. 21 Tab 0    tramadol (ULTRAM) 50 MG Tab Take 50 mg by mouth 2 times a day as needed.      acetaminophen (TYLENOL) 500 MG Tab Take 1,000 mg by mouth 2 times a day as needed.      Fluticasone Propionate (FLONASE NA) Spray 1 Spray in nose every day.      loratadine (CLARITIN) 10 MG Tab Take 10 mg by mouth every day.      Pseudoephedrine HCl (SUDAFED 24 HOUR PO) Take 1 Tab by mouth every day.       No current [facility]-ordered facility-administered medications on file.           ROS:  Negative except for above in HPI    Objective:     Exam:  BP (!) 142/96 (BP Location: Left arm, Patient Position: Sitting, BP Cuff Size: Adult)   Pulse 100   Temp 37.1 °C (98.7 °F) (Temporal)   Resp 18   Ht 1.651 m (5' 5\")   Wt 73 kg (161 lb)   LMP 10/21/2012   SpO2 95%   BMI 26.79 kg/m²  Body mass index is 26.79 kg/m².    Gen: Alert and oriented, No apparent distress.  HEENT: NCAT, MMM, No lymphadenopathy  Lungs: Normal effort, CTA bilaterally, no wheezes, rhonchi, or rales  CV: Regular rate and rhythm. No murmurs, rubs, or gallops. Radial pulses palpable bilat  Abd: Soft, non-distended, no guarding, no rebound, non-tender to palpation  Ext: Straight leg raise test positive for tenderness on left side; lumbar region slightly tender to palpation, some rom limitation present w/ flex/ext of spine; No clubbing, cyanosis, " edema.  Neuro: Non-focal        Assessment & Plan:     52 y.o. female with multiple chronic medical issues, overall stable at this time requesting referrals and follow-ups.  Patient's hypertension uncontrolled due to inadequate medication use, she is asymptomatic.    #History of spinal surgery  #Lumbar region pain  # history of scoliosis  -Referral to neurosurgery  -Referral to physical therapy  -Continue gabapentin  -Decrease NSAID use given history of hiatal hernia and GERD symptoms; alternate with Tylenol until patient seen by GI    #Hiatal hernia  -Prilosec daily until patient sees GI  -GI referral    #Hypertension  -Refill lisinopril  -Monitor blood pressures at home    #Family history of colon cancer in mother  #Breast cancer screening  -Referral for GI for colonoscopy cancer screening  -Breast cancer mammography screening order given    Problem List Items Addressed This Visit       Degenerative disc disease, lumbar    Relevant Orders    Referral to Neurosurgery    Referral to Physical Therapy    Spinal stenosis of lumbar region with radiculopathy    Relevant Orders    Referral to Neurosurgery    Referral to Physical Therapy     Other Visit Diagnoses       Encounter for screening mammogram for breast cancer        Screening for colorectal cancer        Overweight (BMI 25.0-29.9)        Relevant Orders    Comp Metabolic Panel    Lipid Profile    HEMOGLOBIN A1C    Primary hypertension        Relevant Medications    lisinopril (PRINIVIL) 20 MG Tab    Other Relevant Orders    Comp Metabolic Panel    Family history of colon cancer        Relevant Orders    Referral to GI for Colonoscopy    CBC WITH DIFFERENTIAL    Encounter for screening for malignant neoplasm of breast, unspecified screening modality        Relevant Orders    MA-SCREENING MAMMO BILAT W/TOMOSYNTHESIS W/CAD    History of spinal surgery        Hiatal hernia        Relevant Orders    Comp Metabolic Panel    Referral to Gastroenterology            No  follow-ups on file.

## 2023-02-08 ENCOUNTER — APPOINTMENT (OUTPATIENT)
Dept: MEDICAL GROUP | Facility: CLINIC | Age: 53
End: 2023-02-08
Payer: MEDICAID

## 2023-03-15 ENCOUNTER — APPOINTMENT (OUTPATIENT)
Dept: URGENT CARE | Facility: CLINIC | Age: 53
End: 2023-03-15
Payer: MEDICAID

## 2023-05-19 ENCOUNTER — APPOINTMENT (OUTPATIENT)
Dept: ADMISSIONS | Facility: MEDICAL CENTER | Age: 53
End: 2023-05-19
Attending: NEUROLOGICAL SURGERY
Payer: MEDICAID

## 2023-05-25 ENCOUNTER — PRE-ADMISSION TESTING (OUTPATIENT)
Dept: ADMISSIONS | Facility: MEDICAL CENTER | Age: 53
End: 2023-05-25
Attending: NEUROLOGICAL SURGERY
Payer: MEDICAID

## 2023-05-25 RX ORDER — OMEPRAZOLE 20 MG/1
20 CAPSULE, DELAYED RELEASE ORAL EVERY MORNING
COMMUNITY

## 2023-05-25 RX ORDER — LISINOPRIL 40 MG/1
40 TABLET ORAL DAILY
COMMUNITY
Start: 2023-05-18

## 2023-05-25 NOTE — OR NURSING
I called Dr. Jaspreet Reyes's  and left a telephone voice message that when I did the preadmission appointment with Justina Melo she was in [place], [place] and didn't think she could come in for testing on 5/26/23 as ordered because they were flying stand-by on 5/26/23. I scheduled her for testing on 5/30/23 at 8:50AM.

## 2023-05-30 ENCOUNTER — PRE-ADMISSION TESTING (OUTPATIENT)
Dept: ADMISSIONS | Facility: MEDICAL CENTER | Age: 53
End: 2023-05-30
Attending: NEUROLOGICAL SURGERY
Payer: MEDICAID

## 2023-05-30 DIAGNOSIS — Z01.812 PRE-OPERATIVE LABORATORY EXAMINATION: ICD-10-CM

## 2023-05-30 DIAGNOSIS — Z01.810 PRE-OPERATIVE CARDIOVASCULAR EXAMINATION: ICD-10-CM

## 2023-05-30 LAB
ANION GAP SERPL CALC-SCNC: 13 MMOL/L (ref 7–16)
APTT PPP: 28.9 SEC (ref 24.7–36)
BASOPHILS # BLD AUTO: 1.9 % (ref 0–1.8)
BASOPHILS # BLD: 0.09 K/UL (ref 0–0.12)
BUN SERPL-MCNC: 9 MG/DL (ref 8–22)
CALCIUM SERPL-MCNC: 9.7 MG/DL (ref 8.5–10.5)
CHLORIDE SERPL-SCNC: 100 MMOL/L (ref 96–112)
CO2 SERPL-SCNC: 26 MMOL/L (ref 20–33)
CREAT SERPL-MCNC: 0.75 MG/DL (ref 0.5–1.4)
EKG IMPRESSION: NORMAL
EOSINOPHIL # BLD AUTO: 0.08 K/UL (ref 0–0.51)
EOSINOPHIL NFR BLD: 1.7 % (ref 0–6.9)
ERYTHROCYTE [DISTWIDTH] IN BLOOD BY AUTOMATED COUNT: 43.9 FL (ref 35.9–50)
GFR SERPLBLD CREATININE-BSD FMLA CKD-EPI: 95 ML/MIN/1.73 M 2
GLUCOSE SERPL-MCNC: 105 MG/DL (ref 65–99)
HCT VFR BLD AUTO: 43.1 % (ref 37–47)
HGB BLD-MCNC: 14.8 G/DL (ref 12–16)
IMM GRANULOCYTES # BLD AUTO: 0.02 K/UL (ref 0–0.11)
IMM GRANULOCYTES NFR BLD AUTO: 0.4 % (ref 0–0.9)
INR PPP: 0.99 (ref 0.87–1.13)
LYMPHOCYTES # BLD AUTO: 1.64 K/UL (ref 1–4.8)
LYMPHOCYTES NFR BLD: 34.9 % (ref 22–41)
MCH RBC QN AUTO: 33.9 PG (ref 27–33)
MCHC RBC AUTO-ENTMCNC: 34.3 G/DL (ref 32.2–35.5)
MCV RBC AUTO: 98.6 FL (ref 81.4–97.8)
MONOCYTES # BLD AUTO: 0.39 K/UL (ref 0–0.85)
MONOCYTES NFR BLD AUTO: 8.3 % (ref 0–13.4)
NEUTROPHILS # BLD AUTO: 2.48 K/UL (ref 1.82–7.42)
NEUTROPHILS NFR BLD: 52.8 % (ref 44–72)
NRBC # BLD AUTO: 0 K/UL
NRBC BLD-RTO: 0 /100 WBC (ref 0–0.2)
PLATELET # BLD AUTO: 302 K/UL (ref 164–446)
PMV BLD AUTO: 8.6 FL (ref 9–12.9)
POTASSIUM SERPL-SCNC: 4.6 MMOL/L (ref 3.6–5.5)
PROTHROMBIN TIME: 13 SEC (ref 12–14.6)
RBC # BLD AUTO: 4.37 M/UL (ref 4.2–5.4)
SODIUM SERPL-SCNC: 139 MMOL/L (ref 135–145)
WBC # BLD AUTO: 4.7 K/UL (ref 4.8–10.8)

## 2023-05-30 PROCEDURE — 80048 BASIC METABOLIC PNL TOTAL CA: CPT

## 2023-05-30 PROCEDURE — 85610 PROTHROMBIN TIME: CPT

## 2023-05-30 PROCEDURE — 85025 COMPLETE CBC W/AUTO DIFF WBC: CPT

## 2023-05-30 PROCEDURE — 93010 ELECTROCARDIOGRAM REPORT: CPT | Performed by: INTERNAL MEDICINE

## 2023-05-30 PROCEDURE — 93005 ELECTROCARDIOGRAM TRACING: CPT

## 2023-05-30 PROCEDURE — 36415 COLL VENOUS BLD VENIPUNCTURE: CPT

## 2023-05-30 PROCEDURE — 85730 THROMBOPLASTIN TIME PARTIAL: CPT

## 2023-05-31 ENCOUNTER — ANESTHESIA (OUTPATIENT)
Dept: SURGERY | Facility: MEDICAL CENTER | Age: 53
End: 2023-05-31
Payer: MEDICAID

## 2023-05-31 ENCOUNTER — HOSPITAL ENCOUNTER (OUTPATIENT)
Facility: MEDICAL CENTER | Age: 53
End: 2023-05-31
Attending: NEUROLOGICAL SURGERY | Admitting: NEUROLOGICAL SURGERY
Payer: MEDICAID

## 2023-05-31 ENCOUNTER — APPOINTMENT (OUTPATIENT)
Dept: RADIOLOGY | Facility: MEDICAL CENTER | Age: 53
End: 2023-05-31
Attending: NEUROLOGICAL SURGERY
Payer: MEDICAID

## 2023-05-31 ENCOUNTER — ANESTHESIA EVENT (OUTPATIENT)
Dept: SURGERY | Facility: MEDICAL CENTER | Age: 53
End: 2023-05-31
Payer: MEDICAID

## 2023-05-31 VITALS
RESPIRATION RATE: 18 BRPM | TEMPERATURE: 97.2 F | HEART RATE: 89 BPM | HEIGHT: 65 IN | OXYGEN SATURATION: 94 % | DIASTOLIC BLOOD PRESSURE: 91 MMHG | SYSTOLIC BLOOD PRESSURE: 141 MMHG | WEIGHT: 159.83 LBS | BODY MASS INDEX: 26.63 KG/M2

## 2023-05-31 DIAGNOSIS — G89.18 POSTOPERATIVE PAIN: ICD-10-CM

## 2023-05-31 LAB — PATHOLOGY CONSULT NOTE: NORMAL

## 2023-05-31 PROCEDURE — 160036 HCHG PACU - EA ADDL 30 MINS PHASE I: Performed by: NEUROLOGICAL SURGERY

## 2023-05-31 PROCEDURE — 160048 HCHG OR STATISTICAL LEVEL 1-5: Performed by: NEUROLOGICAL SURGERY

## 2023-05-31 PROCEDURE — 700117 HCHG RX CONTRAST REV CODE 255: Mod: UD | Performed by: NEUROLOGICAL SURGERY

## 2023-05-31 PROCEDURE — 160046 HCHG PACU - 1ST 60 MINS PHASE II: Performed by: NEUROLOGICAL SURGERY

## 2023-05-31 PROCEDURE — 700101 HCHG RX REV CODE 250: Mod: UD | Performed by: NEUROLOGICAL SURGERY

## 2023-05-31 PROCEDURE — C1713 ANCHOR/SCREW BN/BN,TIS/BN: HCPCS | Performed by: NEUROLOGICAL SURGERY

## 2023-05-31 PROCEDURE — 01941 ANES NEUROMD/NTRVRT CRV/THRC: CPT | Performed by: ANESTHESIOLOGY

## 2023-05-31 PROCEDURE — 160035 HCHG PACU - 1ST 60 MINS PHASE I: Performed by: NEUROLOGICAL SURGERY

## 2023-05-31 PROCEDURE — 700105 HCHG RX REV CODE 258: Mod: UD | Performed by: NEUROLOGICAL SURGERY

## 2023-05-31 PROCEDURE — 160039 HCHG SURGERY MINUTES - EA ADDL 1 MIN LEVEL 3: Performed by: NEUROLOGICAL SURGERY

## 2023-05-31 PROCEDURE — 88307 TISSUE EXAM BY PATHOLOGIST: CPT

## 2023-05-31 PROCEDURE — 160025 RECOVERY II MINUTES (STATS): Performed by: NEUROLOGICAL SURGERY

## 2023-05-31 PROCEDURE — 700111 HCHG RX REV CODE 636 W/ 250 OVERRIDE (IP): Mod: UD | Performed by: ANESTHESIOLOGY

## 2023-05-31 PROCEDURE — 88311 DECALCIFY TISSUE: CPT

## 2023-05-31 PROCEDURE — 700101 HCHG RX REV CODE 250: Mod: UD | Performed by: ANESTHESIOLOGY

## 2023-05-31 PROCEDURE — 72100 X-RAY EXAM L-S SPINE 2/3 VWS: CPT

## 2023-05-31 PROCEDURE — 160002 HCHG RECOVERY MINUTES (STAT): Performed by: NEUROLOGICAL SURGERY

## 2023-05-31 PROCEDURE — 160028 HCHG SURGERY MINUTES - 1ST 30 MINS LEVEL 3: Performed by: NEUROLOGICAL SURGERY

## 2023-05-31 PROCEDURE — 700102 HCHG RX REV CODE 250 W/ 637 OVERRIDE(OP): Mod: UD | Performed by: ANESTHESIOLOGY

## 2023-05-31 PROCEDURE — A9270 NON-COVERED ITEM OR SERVICE: HCPCS | Mod: UD | Performed by: ANESTHESIOLOGY

## 2023-05-31 PROCEDURE — 160009 HCHG ANES TIME/MIN: Performed by: NEUROLOGICAL SURGERY

## 2023-05-31 DEVICE — BONE CEMENT & MIXER FOR KYPHO: Type: IMPLANTABLE DEVICE | Site: BACK | Status: FUNCTIONAL

## 2023-05-31 RX ORDER — HYDRALAZINE HYDROCHLORIDE 20 MG/ML
5 INJECTION INTRAMUSCULAR; INTRAVENOUS
Status: DISCONTINUED | OUTPATIENT
Start: 2023-05-31 | End: 2023-05-31 | Stop reason: HOSPADM

## 2023-05-31 RX ORDER — MIDAZOLAM HYDROCHLORIDE 1 MG/ML
INJECTION INTRAMUSCULAR; INTRAVENOUS PRN
Status: DISCONTINUED | OUTPATIENT
Start: 2023-05-31 | End: 2023-05-31 | Stop reason: SURG

## 2023-05-31 RX ORDER — HALOPERIDOL 5 MG/ML
1 INJECTION INTRAMUSCULAR
Status: DISCONTINUED | OUTPATIENT
Start: 2023-05-31 | End: 2023-05-31 | Stop reason: HOSPADM

## 2023-05-31 RX ORDER — DIPHENHYDRAMINE HYDROCHLORIDE 50 MG/ML
12.5 INJECTION INTRAMUSCULAR; INTRAVENOUS
Status: DISCONTINUED | OUTPATIENT
Start: 2023-05-31 | End: 2023-05-31 | Stop reason: HOSPADM

## 2023-05-31 RX ORDER — ONDANSETRON 2 MG/ML
INJECTION INTRAMUSCULAR; INTRAVENOUS PRN
Status: DISCONTINUED | OUTPATIENT
Start: 2023-05-31 | End: 2023-05-31 | Stop reason: SURG

## 2023-05-31 RX ORDER — HYDROCODONE BITARTRATE AND ACETAMINOPHEN 5; 325 MG/1; MG/1
1 TABLET ORAL EVERY 6 HOURS PRN
Qty: 28 TABLET | Refills: 0 | Status: SHIPPED | OUTPATIENT
Start: 2023-05-31 | End: 2023-06-07

## 2023-05-31 RX ORDER — HYDROMORPHONE HYDROCHLORIDE 1 MG/ML
0.2 INJECTION, SOLUTION INTRAMUSCULAR; INTRAVENOUS; SUBCUTANEOUS
Status: DISCONTINUED | OUTPATIENT
Start: 2023-05-31 | End: 2023-05-31 | Stop reason: HOSPADM

## 2023-05-31 RX ORDER — ROCURONIUM BROMIDE 10 MG/ML
INJECTION, SOLUTION INTRAVENOUS PRN
Status: DISCONTINUED | OUTPATIENT
Start: 2023-05-31 | End: 2023-05-31 | Stop reason: SURG

## 2023-05-31 RX ORDER — CYCLOBENZAPRINE HCL 10 MG
10 TABLET ORAL 3 TIMES DAILY PRN
Qty: 30 TABLET | Refills: 0 | Status: SHIPPED | OUTPATIENT
Start: 2023-05-31 | End: 2023-06-14

## 2023-05-31 RX ORDER — BUPIVACAINE HYDROCHLORIDE AND EPINEPHRINE 5; 5 MG/ML; UG/ML
INJECTION, SOLUTION EPIDURAL; INTRACAUDAL; PERINEURAL
Status: DISCONTINUED | OUTPATIENT
Start: 2023-05-31 | End: 2023-05-31 | Stop reason: HOSPADM

## 2023-05-31 RX ORDER — CEFAZOLIN SODIUM 1 G/3ML
INJECTION, POWDER, FOR SOLUTION INTRAMUSCULAR; INTRAVENOUS PRN
Status: DISCONTINUED | OUTPATIENT
Start: 2023-05-31 | End: 2023-05-31 | Stop reason: SURG

## 2023-05-31 RX ORDER — DEXAMETHASONE SODIUM PHOSPHATE 4 MG/ML
INJECTION, SOLUTION INTRA-ARTICULAR; INTRALESIONAL; INTRAMUSCULAR; INTRAVENOUS; SOFT TISSUE PRN
Status: DISCONTINUED | OUTPATIENT
Start: 2023-05-31 | End: 2023-05-31 | Stop reason: SURG

## 2023-05-31 RX ORDER — OXYCODONE HCL 5 MG/5 ML
5 SOLUTION, ORAL ORAL
Status: COMPLETED | OUTPATIENT
Start: 2023-05-31 | End: 2023-05-31

## 2023-05-31 RX ORDER — VALACYCLOVIR HYDROCHLORIDE 1 G/1
1000 TABLET, FILM COATED ORAL DAILY
COMMUNITY
Start: 2023-05-15

## 2023-05-31 RX ORDER — HYDROMORPHONE HYDROCHLORIDE 1 MG/ML
0.4 INJECTION, SOLUTION INTRAMUSCULAR; INTRAVENOUS; SUBCUTANEOUS
Status: DISCONTINUED | OUTPATIENT
Start: 2023-05-31 | End: 2023-05-31 | Stop reason: HOSPADM

## 2023-05-31 RX ORDER — ONDANSETRON 2 MG/ML
4 INJECTION INTRAMUSCULAR; INTRAVENOUS
Status: DISCONTINUED | OUTPATIENT
Start: 2023-05-31 | End: 2023-05-31 | Stop reason: HOSPADM

## 2023-05-31 RX ORDER — SODIUM CHLORIDE, SODIUM LACTATE, POTASSIUM CHLORIDE, CALCIUM CHLORIDE 600; 310; 30; 20 MG/100ML; MG/100ML; MG/100ML; MG/100ML
INJECTION, SOLUTION INTRAVENOUS CONTINUOUS
Status: DISCONTINUED | OUTPATIENT
Start: 2023-05-31 | End: 2023-05-31 | Stop reason: HOSPADM

## 2023-05-31 RX ORDER — MEPERIDINE HYDROCHLORIDE 25 MG/ML
6.25 INJECTION INTRAMUSCULAR; INTRAVENOUS; SUBCUTANEOUS
Status: DISCONTINUED | OUTPATIENT
Start: 2023-05-31 | End: 2023-05-31 | Stop reason: HOSPADM

## 2023-05-31 RX ORDER — HYDRALAZINE HYDROCHLORIDE 20 MG/ML
INJECTION INTRAMUSCULAR; INTRAVENOUS PRN
Status: DISCONTINUED | OUTPATIENT
Start: 2023-05-31 | End: 2023-05-31 | Stop reason: SURG

## 2023-05-31 RX ORDER — OXYCODONE HCL 5 MG/5 ML
10 SOLUTION, ORAL ORAL
Status: COMPLETED | OUTPATIENT
Start: 2023-05-31 | End: 2023-05-31

## 2023-05-31 RX ORDER — HYDROMORPHONE HYDROCHLORIDE 1 MG/ML
0.1 INJECTION, SOLUTION INTRAMUSCULAR; INTRAVENOUS; SUBCUTANEOUS
Status: DISCONTINUED | OUTPATIENT
Start: 2023-05-31 | End: 2023-05-31 | Stop reason: HOSPADM

## 2023-05-31 RX ORDER — METOPROLOL TARTRATE 1 MG/ML
INJECTION, SOLUTION INTRAVENOUS PRN
Status: DISCONTINUED | OUTPATIENT
Start: 2023-05-31 | End: 2023-05-31 | Stop reason: SURG

## 2023-05-31 RX ADMIN — ROCURONIUM BROMIDE 40 MG: 50 INJECTION, SOLUTION INTRAVENOUS at 09:22

## 2023-05-31 RX ADMIN — PROPOFOL 200 MG: 10 INJECTION, EMULSION INTRAVENOUS at 09:21

## 2023-05-31 RX ADMIN — DEXAMETHASONE SODIUM PHOSPHATE 4 MG: 4 INJECTION INTRA-ARTICULAR; INTRALESIONAL; INTRAMUSCULAR; INTRAVENOUS; SOFT TISSUE at 09:30

## 2023-05-31 RX ADMIN — HYDRALAZINE HYDROCHLORIDE 10 MG: 20 INJECTION, SOLUTION INTRAMUSCULAR; INTRAVENOUS at 10:17

## 2023-05-31 RX ADMIN — METOPROLOL TARTRATE 5 MG: 5 INJECTION INTRAVENOUS at 09:54

## 2023-05-31 RX ADMIN — SUGAMMADEX 200 MG: 100 INJECTION, SOLUTION INTRAVENOUS at 10:16

## 2023-05-31 RX ADMIN — FENTANYL CITRATE 25 MCG: 50 INJECTION, SOLUTION INTRAMUSCULAR; INTRAVENOUS at 11:33

## 2023-05-31 RX ADMIN — MIDAZOLAM 2 MG: 1 INJECTION, SOLUTION INTRAMUSCULAR; INTRAVENOUS at 09:15

## 2023-05-31 RX ADMIN — FENTANYL CITRATE 50 MCG: 50 INJECTION, SOLUTION INTRAMUSCULAR; INTRAVENOUS at 10:15

## 2023-05-31 RX ADMIN — OXYCODONE HYDROCHLORIDE 10 MG: 5 SOLUTION ORAL at 11:12

## 2023-05-31 RX ADMIN — CEFAZOLIN 2 G: 1 INJECTION, POWDER, FOR SOLUTION INTRAMUSCULAR; INTRAVENOUS at 09:31

## 2023-05-31 RX ADMIN — FENTANYL CITRATE 50 MCG: 50 INJECTION, SOLUTION INTRAMUSCULAR; INTRAVENOUS at 09:21

## 2023-05-31 RX ADMIN — ONDANSETRON 4 MG: 2 INJECTION INTRAMUSCULAR; INTRAVENOUS at 10:12

## 2023-05-31 RX ADMIN — FENTANYL CITRATE 50 MCG: 50 INJECTION, SOLUTION INTRAMUSCULAR; INTRAVENOUS at 11:12

## 2023-05-31 RX ADMIN — SODIUM CHLORIDE, POTASSIUM CHLORIDE, SODIUM LACTATE AND CALCIUM CHLORIDE: 600; 310; 30; 20 INJECTION, SOLUTION INTRAVENOUS at 09:15

## 2023-05-31 RX ADMIN — FENTANYL CITRATE 50 MCG: 50 INJECTION, SOLUTION INTRAMUSCULAR; INTRAVENOUS at 09:35

## 2023-05-31 RX ADMIN — FENTANYL CITRATE 25 MCG: 50 INJECTION, SOLUTION INTRAMUSCULAR; INTRAVENOUS at 11:22

## 2023-05-31 ASSESSMENT — PAIN SCALES - GENERAL: PAIN_LEVEL: 1

## 2023-05-31 ASSESSMENT — PAIN DESCRIPTION - PAIN TYPE
TYPE: SURGICAL PAIN

## 2023-05-31 NOTE — OR NURSING
1223Patient arrived to phase two via gurney. Patient ambulated to recliner. No pain or nausea reported.    1230 RN went over discharge instructions with patients . All questions answered.     1235 Patient meets discharge criteria. IV removed. VSS. Pt discharged via wheelchair by CNA. Pt in possession of all personal belongings.

## 2023-05-31 NOTE — OR NURSING
Patient recovered well in post-op. AAOx4. VSS, on RA. Surgical sites CDI x2. Surgical pain managed through PO and IV medications. Patient tolerating fluids without nausea. Significant other updated and discussed POC. Glasses in place, no other belongings in pacu. Report called to RENAY Carson. Patient transported to phase II.

## 2023-05-31 NOTE — ANESTHESIA PREPROCEDURE EVALUATION
Case: [id number] Date/Time: 05/31/23 0845    Procedure: L2 KYPHOPLASTY, PERCUTANEOUS BIOPSY    Pre-op diagnosis: OSTEOPOROTIC FACTURE OF VERTEBRA    Location: [facility] OR 05 / SURGERY [facility]    Surgeons: Tyrone Vogel M.D.          Relevant Problems   No relevant active problems       Physical Exam    Airway   Mallampati: II  TM distance: >3 FB  Neck ROM: full       Cardiovascular - normal exam  Rhythm: regular  Rate: normal  (-) murmur  Comments: EKG NSR    Dental - normal exam           Pulmonary - normal exam  Breath sounds clear to auscultation  Comments: 20 year smoking hx quit several yrs ago    Abdominal    Neurological - normal exam                 Anesthesia Plan    ASA 2       Plan - general       Airway plan will be ETT        Plan Factors:   Patient was previously instructed to abstain from smoking on day of procedure.  Patient did not smoke on day of procedure.      Induction: intravenous    Postoperative Plan: Postoperative administration of opioids is intended.    Pertinent diagnostic labs and testing reviewed    Informed Consent:    Anesthetic plan and risks discussed with patient.    Use of blood products discussed with: patient whom consented to blood products.

## 2023-05-31 NOTE — ANESTHESIA PROCEDURE NOTES
Airway    Date/Time: 5/31/2023 9:24 AM    Performed by: Peace Ho M.D.  Authorized by: Peace Ho M.D.    Location:  OR  Urgency:  Elective  Indications for Airway Management:  Anesthesia      Spontaneous Ventilation: absent    Sedation Level:  Deep  Preoxygenated: Yes    Patient Position:  Sniffing  Mask Difficulty Assessment:  1 - vent by mask  Final Airway Type:  Endotracheal airway  Final Endotracheal Airway:  ETT  Cuffed: Yes    Technique Used for Successful ETT Placement:  Direct laryngoscopy    Insertion Site:  Oral  Blade Type:  Sammy  Laryngoscope Blade/Videolaryngoscope Blade Size:  3  ETT Size (mm):  7.0  Measured from:  Teeth  ETT to Teeth (cm):  19  Placement Verified by: auscultation and capnometry    Cormack-Lehane Classification:  Grade I - full view of glottis  Number of Attempts at Approach:  1  Number of Other Approaches Attempted:  0

## 2023-05-31 NOTE — ANESTHESIA TIME REPORT
Anesthesia Start and Stop Event Times     Date Time Event    5/31/2023 09:07 AM Ready for Procedure    5/31/2023 09:15 AM Anesthesia Start    5/31/2023 10:25 AM Anesthesia Stop        Responsible Staff  05/31/23    Name Role Begin End    Peace Ho M.D. Anesthesiologist 05/31/23 09:15 AM 05/31/23 10:25 AM        Overtime Reason:  no overtime (within assigned shift)    Comments:

## 2023-05-31 NOTE — DISCHARGE INSTRUCTIONS
HOME CARE INSTRUCTIONS    ACTIVITY: Rest and take it easy for the first 24 hours.  A responsible adult is recommended to remain with you during that time.  It is normal to feel sleepy.  We encourage you to not do anything that requires balance, judgment or coordination.    FOR 24 HOURS DO NOT:  Drive, operate machinery or run household appliances.  Drink beer or alcoholic beverages.  Make important decisions or sign legal documents.    SPECIAL INSTRUCTIONS:     Keep incision clean and dry. No dressing required over incision.   OK to shower and pat dry.   Do not soak incision in bath, hot tub, etc.   Take over the counter stool softener daily with pain medication.   Do not lift anything over 10 pounds. No repetitive bending, lifting, twisting, movements.   No Aspirin or anticoagulants until cleared by Neurosurgery.   No driving if taking narcotic medication.   Follow up with [facility] in 2 weeks as scheduled,       Or call for an appointment [phone]      Rx norco 5mg/flexeril sent to [facility]    DIET: To avoid nausea, slowly advance diet as tolerated, avoiding spicy or greasy foods for the first day.  Add more substantial food to your diet according to your physician's instructions.  Babies can be fed formula or breast milk as soon as they are hungry.  INCREASE FLUIDS AND FIBER TO AVOID CONSTIPATION.    MEDICATIONS: Resume taking daily medication.  Take prescribed pain medication with food.  If no medication is prescribed, you may take non-aspirin pain medication if needed.  PAIN MEDICATION CAN BE VERY CONSTIPATING.  Take a stool softener or laxative such as senokot, pericolace, or milk of magnesia if needed.    Prescription given for NORCO.  Last pain medication given at 11:15 AM. Next dose due at 5:15 PM if needed.    A follow-up appointment should be arranged with your doctor in 1-2 weeks; call to schedule.    You should CALL YOUR PHYSICIAN if you develop:  Fever greater than 101 degrees F.  Pain  not relieved by medication, or persistent nausea or vomiting.  Excessive bleeding (blood soaking through dressing) or unexpected drainage from the wound.  Extreme redness or swelling around the incision site, drainage of pus or foul smelling drainage.  Inability to urinate or empty your bladder within 8 hours.  Problems with breathing or chest pain.    You should call 911 if you develop problems with breathing or chest pain.  If you are unable to contact your doctor or surgical center, you should go to the nearest emergency room or urgent care center.  Physician's telephone #: [phone]    MILD FLU-LIKE SYMPTOMS ARE NORMAL.  YOU MAY EXPERIENCE GENERALIZED MUSCLE ACHES, THROAT IRRITATION, HEADACHE AND/OR SOME NAUSEA.    If any questions arise, call your doctor.  If your doctor is not available, please feel free to call the Surgical Center at [phone].  The Center is open Monday through Friday from 7AM to 7PM.      A registered nurse may call you a few days after your surgery to see how you are doing after your procedure.    You may also receive a survey in the mail within the next two weeks and we ask that you take a few moments to complete the survey and return it to us.  Our goal is to provide you with very good care and we value your comments.     Depression / Suicide Risk    As you are discharged from this [facility] facility, it is important to learn how to keep safe from harming yourself.    Recognize the warning signs:  Abrupt changes in personality, positive or negative- including increase in energy   Giving away possessions  Change in eating patterns- significant weight changes-  positive or negative  Change in sleeping patterns- unable to sleep or sleeping all the time   Unwillingness or inability to communicate  Depression  Unusual sadness, discouragement and loneliness  Talk of wanting to die  Neglect of personal appearance   Rebelliousness- reckless behavior  Withdrawal from people/activities  they love  Confusion- inability to concentrate     If you or a loved one observes any of these behaviors or has concerns about self-harm, here's what you can do:  Talk about it- your feelings and reasons for harming yourself  Remove any means that you might use to hurt yourself (examples: pills, rope, extension cords, firearm)  Get professional help from the community (Mental Health, Substance Abuse, psychological counseling)  Do not be alone:Call your Safe Contact- someone whom you trust who will be there for you.  Call your local CRISIS HOTLINE 359-1195 or 834-765-3495  Call your local Children's Mobile Crisis Response Team [place] (657) 675-1473 or www.iLike  Call the toll free National Suicide Prevention Hotlines   National Suicide Prevention Lifeline 884-824-ZMRD (1126)  National Hope Line Network 800-SUICIDE (848-0461)    I acknowledge receipt and understanding of these Home Care instructions.

## 2023-05-31 NOTE — ANESTHESIA POSTPROCEDURE EVALUATION
Patient: Justina Melo    Procedure Summary     Date: 05/31/23 Room / Location: [facility] 05 / SURGERY [facility]    Anesthesia Start: 0915 Anesthesia Stop: 1025    Procedure: L2 KYPHOPLASTY, PERCUTANEOUS BIOPSY (Back) Diagnosis: (OSTEOPOROTIC FACTURE OF VERTEBRA)    Surgeons: Tyrone Vogel M.D. Responsible Provider: Peace Ho M.D.    Anesthesia Type: general ASA Status: 2          Final Anesthesia Type: general  Last vitals  BP   Blood Pressure: (Abnormal) 141/91    Temp   36.2 °C (97.2 °F)    Pulse   89   Resp   18    SpO2   94 %      Anesthesia Post Evaluation    Patient location during evaluation: PACU  Patient participation: complete - patient participated  Level of consciousness: awake and alert  Pain score: 1    Airway patency: patent  Anesthetic complications: no  Cardiovascular status: hemodynamically stable  Respiratory status: acceptable  Hydration status: euvolemic    PONV: none          No notable events documented.     Nurse Pain Score: 1 (NPRS)

## 2023-05-31 NOTE — OP REPORT
NEUROSURGERY OPERATIVE NOTE  DATE:  10:33 AM 2023    PATIENT NAME:  Justina Melo   1970 MRN [id number]      PROCEDURE:  1.  Lumbar 2 percutaneous bone biopsy  2.  Lumbar 2 kyphoplasty ([employer]).  3.  Modifier 22: Significant increased technical difficulty and length of procedure time given presence of extensive posterior fusion with Lau rods and wires at the treated level, making visualization difficult.    Surgeon:  Tyrone Vogel MD, PhD  Assistant: None    Anesthesia:  GETA    Diagnosis: Lumbar 2 compression fracture    Indication: 52-year-old female who is undergone prior Lau cleo stabilization of the thoracal lumbar region ending at L1-3, and lumbar 5/sacral 1 instrumented fusion.  She has significant upper lumbar midline pain.  MRI demonstrates compression fracture at L2 with increased STIR signal consistent with nonhealing fracture.  She has failed bracing.  Percutaneous biopsy and kyphoplasty is planned for treatment.    Procedure:  The patient was identified in the holding area, and the surgery site marked, consent was obtained.  The patinet was brought back to the operating room and intubated by anesthesia service.  2 grams Ancef was administered intravenously.  She was transferred to the OSI operating room table in a prone manner and all pressure points well padded.  Their lumbar region was prepped with  chlorhexidine  scrub, and Chloroprep.  Sterile drapes were applied including a layer of Ioban.  The correct vertebral level was identified flouroscopically.     Given the Lau rods, the AP fluoroscopy unit was brought over to the right, giving a inline view of the pedicle.  The skin overlying the right lumbar 2 pedicle was infiltrated 0.5% Marcaine with epinephrine.  Infiltration was carried deep to the facet.  Small skin incision was created, and a Kyphon trocar introduced through the pedicle into the vertebral body.  Biopsy was obtained and sent for permanent  pathology.  The AP fluoroscopic unit was then brought over to the left side, giving a midline view of the pedicle access was then obtained on the left side in like manner with no biopsy obtained.  The AP unit was then brought to a true midline position.  Balloons were placed bilaterally, slowly inflated under fluoroscopic guidance.  High pressures were encountered.  The balloons were deflated and removed.  A curette was introduced through the Kyphon introducer needle, and the vertebral body curetted bilaterally in all directions except laterally.  The curette was removed.  The balloons were placed and slowly inflated under fluoroscopic guidance.  The balloons were deflated and removed.  Kyphon cement was slowly infiltrated into the vertebral body bilaterally under fluoroscopic guidance.  Good filling of the vertebral body was obtained anterior to posterior and pedicle to pedicle.  Once the cement had hardened, the trocar introducer needles were removed.      The dermis was reapproximated with 4-0 Vicryl suture interrupted inverted manner.  The skin was reapproximated with Dermabond.  Final counts were correct.    FINDINGS: Successful lumbar 2 bone biopsy, kyphoplasty.    SPECIMEN: Lumbar 2 vertebral body for permanent pathology  DRAINS: None  EBL:  2 CC  COMPLICATIONS:  None apparent at end of procedure.

## 2024-04-30 ENCOUNTER — HOSPITAL ENCOUNTER (OUTPATIENT)
Dept: RADIOLOGY | Facility: MEDICAL CENTER | Age: 54
End: 2024-04-30
Attending: NURSE PRACTITIONER
Payer: MEDICAID

## 2024-04-30 ENCOUNTER — HOSPITAL ENCOUNTER (OUTPATIENT)
Dept: RADIOLOGY | Facility: MEDICAL CENTER | Age: 54
End: 2024-04-30
Attending: NEUROLOGICAL SURGERY
Payer: MEDICAID

## 2024-04-30 DIAGNOSIS — M51.36 OTHER INTERVERTEBRAL DISC DEGENERATION, LUMBAR REGION: ICD-10-CM

## 2024-04-30 DIAGNOSIS — Z12.31 VISIT FOR SCREENING MAMMOGRAM: ICD-10-CM

## 2024-04-30 PROCEDURE — 77067 SCR MAMMO BI INCL CAD: CPT

## 2024-04-30 PROCEDURE — 77080 DXA BONE DENSITY AXIAL: CPT

## 2024-05-31 ENCOUNTER — HOSPITAL ENCOUNTER (OUTPATIENT)
Dept: RADIOLOGY | Facility: MEDICAL CENTER | Age: 54
End: 2024-05-31
Attending: NURSE PRACTITIONER
Payer: MEDICAID

## 2024-05-31 DIAGNOSIS — R92.8 ABNORMAL MAMMOGRAM: ICD-10-CM

## 2024-05-31 PROCEDURE — G0279 TOMOSYNTHESIS, MAMMO: HCPCS

## 2024-05-31 PROCEDURE — 76642 ULTRASOUND BREAST LIMITED: CPT | Mod: LT

## 2024-10-24 ENCOUNTER — APPOINTMENT (OUTPATIENT)
Dept: RADIOLOGY | Facility: MEDICAL CENTER | Age: 54
End: 2024-10-24
Attending: SPECIALIST/TECHNOLOGIST
Payer: MEDICAID

## 2025-06-04 ENCOUNTER — APPOINTMENT (OUTPATIENT)
Dept: RADIOLOGY | Facility: MEDICAL CENTER | Age: 55
End: 2025-06-04
Attending: NURSE PRACTITIONER
Payer: MEDICAID

## 2025-06-10 ENCOUNTER — HOSPITAL ENCOUNTER (OUTPATIENT)
Dept: RADIOLOGY | Facility: MEDICAL CENTER | Age: 55
End: 2025-06-10
Attending: NURSE PRACTITIONER
Payer: COMMERCIAL

## 2025-06-10 ENCOUNTER — HOSPITAL ENCOUNTER (OUTPATIENT)
Dept: RADIOLOGY | Facility: MEDICAL CENTER | Age: 55
End: 2025-06-10
Attending: NURSE PRACTITIONER
Payer: MEDICAID

## 2025-06-10 DIAGNOSIS — M47.816 LUMBAR SPONDYLOSIS: ICD-10-CM

## 2025-06-10 PROCEDURE — 72110 X-RAY EXAM L-2 SPINE 4/>VWS: CPT

## 2025-06-10 PROCEDURE — 72131 CT LUMBAR SPINE W/O DYE: CPT

## (undated) DEVICE — DRAPE STRLE REG TOWEL 18X24 - (10/BX 4BX/CA)"

## (undated) DEVICE — GOWN WARMING STANDARD FLEX - (30/CA)

## (undated) DEVICE — CLIP LG INTNL HRZN TI ESCP LGT - (20/BX)

## (undated) DEVICE — ARMREST CRADLE FOAM - (2PR/PK 12PR/CA)

## (undated) DEVICE — DRAPE LAPAROTOMY T SHEET - (12EA/CA)

## (undated) DEVICE — MASK ANESTHESIA ADULT  - (100/CA)

## (undated) DEVICE — MIDAS LUBRICATOR DIFFUSER PACK (4EA/CA)

## (undated) DEVICE — CHLORAPREP 26 ML APPLICATOR - ORANGE TINT(25/CA)

## (undated) DEVICE — SUTURE GENERAL

## (undated) DEVICE — LIGHT SOURCE MIS 12FT

## (undated) DEVICE — CELLSAVER PACK

## (undated) DEVICE — GLOVE BIOGEL SZ 7.5 SURGICAL PF LTX - (50PR/BX 4BX/CA)

## (undated) DEVICE — SLEEVE, VASO, THIGH, MED

## (undated) DEVICE — TOOL DISSECT MATCH HEAD

## (undated) DEVICE — TUBE E-T HI-LO CUFF 7.0MM (10EA/PK)

## (undated) DEVICE — LACTATED RINGERS INJ 1000 ML - (14EA/CA 60CA/PF)

## (undated) DEVICE — SYRINGE SAFETY 10 ML 18 GA X 1 1/2 BLUNT LL (100/BX 4BX/CA)

## (undated) DEVICE — PACK MINOR BASIN - (2EA/CA)

## (undated) DEVICE — DRAPE LARGE 3 QUARTER - (20/CA)

## (undated) DEVICE — DRAPE SRG LG BCK TBL DISP - 10/CA

## (undated) DEVICE — GLOVE PROTEXIS W/NEU-THERA SZ 8.5 (50PR/BX)

## (undated) DEVICE — FIBRILLAR SURGICEL 4X4 - 10/CA

## (undated) DEVICE — SET LEADWIRE 5 LEAD BEDSIDE DISPOSABLE ECG (1SET OF 5/EA)

## (undated) DEVICE — SUCTION INSTRUMENT YANKAUER BULBOUS TIP W/O VENT (50EA/CA)

## (undated) DEVICE — SODIUM CHL IRRIGATION 0.9% 1000ML (12EA/CA)

## (undated) DEVICE — TUBING CLEARLINK DUO-VENT - C-FLO (48EA/CA)

## (undated) DEVICE — SUTURE 1 VICRYL PLUS CTX - 8 X 18 INCH (12/BX)

## (undated) DEVICE — TUBE E-T HI-LO CUFF 8.0MM (10EA/PK)

## (undated) DEVICE — HEADREST PRONEVIEW LARGE - (10/CA)

## (undated) DEVICE — SUCTION TIP STRAIGHT ARGYLE - 50EA/CA

## (undated) DEVICE — DRAPE CLEAR W/ELASTIC BAND RAD CARM 40 X40" (20EA/CA)"

## (undated) DEVICE — BOVIE  BLADE 6 EXTENDED - (50/PK)

## (undated) DEVICE — CLIP MED INTNL HRZN TI ESCP - (25/BX)

## (undated) DEVICE — CANISTER SUCTION 3000ML MECHANICAL FILTER AUTO SHUTOFF MEDI-VAC NONSTERILE LF DISP  (40EA/CA)

## (undated) DEVICE — TIP CURETTE KYPHON EXPRESS

## (undated) DEVICE — ELECTRODE DUAL RETURN W/ CORD - (50/PK)

## (undated) DEVICE — TUBING C&T SET FLYING LEADS DRAIN TUBING (10EA/BX)

## (undated) DEVICE — ELECTRODE 850 FOAM ADHESIVE - HYDROGEL RADIOTRNSPRNT (50/PK)

## (undated) DEVICE — TOWELS CLOTH SURGICAL - (4/PK 20PK/CA)

## (undated) DEVICE — NEEDLE NON-SAFETY HYPO 21 GA X 1 1/2 IN HYPO (100/BX)

## (undated) DEVICE — GLOVE SZ 8 BIOGEL PI MICRO - PF LF (50PR/BX)

## (undated) DEVICE — SPONGE RADIOPAQUE CTN X-LG - STERILE (50PK/CA) MADE TO ORDER ITEM AND HAS A 4-6 WEEK LEAD TIME

## (undated) DEVICE — SLEEVE STERILE  A599T - 30/BX 2BX/CS

## (undated) DEVICE — CATHETER EPIDURAL PORTEX (10/BX) ***DISCONTINUED LOOKING INTO SUB***

## (undated) DEVICE — SUTURE 4-0 VICRYL PLUSFS-1 - 27 INCH (36/BX)

## (undated) DEVICE — GLOVE BIOGEL INDICATOR SZ 8 SURGICAL PF LTX - (50/BX 4BX/CA)

## (undated) DEVICE — BONE BIOPSY DEVICE

## (undated) DEVICE — CLIP MED LG INTNL HRZN TI ESCP - (20/BX)

## (undated) DEVICE — GOWN SURGEONS X-LARGE - DISP. (30/CA)

## (undated) DEVICE — SUTURE 2-0 SILK 12 REEL (12PK/BX)"

## (undated) DEVICE — LACTATED RINGERS INJ. 500 ML - (24EA/CA)

## (undated) DEVICE — SYRINGE 30 ML LL (56/BX)

## (undated) DEVICE — PAD LAP STERILE 18 X 18 - (5/PK 40PK/CA)

## (undated) DEVICE — COVER LIGHT HANDLE ALC PLUS DISP (18EA/BX)

## (undated) DEVICE — CELLSAVER STAT

## (undated) DEVICE — SENSOR OXIMETER ADULT SPO2 RD SET (20EA/BX)

## (undated) DEVICE — KIT SURGIFLO W/OUT THROMBIN - (6EA/CA)

## (undated) DEVICE — COVER MAYO STAND X-LG - (22EA/CA)

## (undated) DEVICE — SUTURE 1 VICRYL PLUS CT-1 - 18 INCH (12/BX)

## (undated) DEVICE — SENSOR SPO2 NEO LNCS ADHESIVE (20/BX) SEE USER NOTES

## (undated) DEVICE — DRAPE 36X28IN RAD CARM BND BG - (25/CA) O

## (undated) DEVICE — TOWEL STOP TIMEOUT SAFETY FLAG (40EA/CA)

## (undated) DEVICE — GLOVE BIOGEL ECLIPSE PF LATEX SIZE 8.5 (50PR/BX)

## (undated) DEVICE — KIT ROOM DECONTAMINATION

## (undated) DEVICE — SUTURE 2-0 VICRYL PLUS CT-1 - 8 X 18 INCH(12/BX)

## (undated) DEVICE — KIT ANESTHESIA W/CIRCUIT & 3/LT BAG W/FILTER (20EA/CA)

## (undated) DEVICE — INTRAOP NEURO IN OR 1:1 PER 15 MIN

## (undated) DEVICE — DRAPE MAYO STAND - (30/CA)

## (undated) DEVICE — DERMABOND ADVANCED - (12EA/BX)

## (undated) DEVICE — Device

## (undated) DEVICE — SUTURE 4-0 VICRYL PLUS FS-2 - 27 INCH (36/BX)

## (undated) DEVICE — TRAY CATHETER FOLEY URINE METER W/STATLOCK 350ML (10EA/CA)

## (undated) DEVICE — PACK NEURO - (2EA/CA)

## (undated) DEVICE — GLOVE PROTEXIS PI MICRO SZ 8.5 (200PR/CA)

## (undated) DEVICE — TRAY EXPANDERINFLATABLE BONE TAMP FF 1-STEP WITH CDS KYPHOPAK 15/3

## (undated) DEVICE — KIT EVACUATER 3 SPRING PVC LF 1/8 DRAIN SIZE (10EA/CA)"

## (undated) DEVICE — SET EXTENSION WITH 2 PORTS (48EA/CA) ***PART #2C8610 IS A SUBSTITUTE*****

## (undated) DEVICE — PACK JACKSON TABLE KIT W/OUT - HR (6EA/CA)

## (undated) DEVICE — NEPTUNE 4 PORT MANIFOLD - (20/PK)

## (undated) DEVICE — DRAPE IOBAN II 23 IN X 33 IN - (10/BX)

## (undated) DEVICE — SPONGE GAUZESTER 4 X 4 4PLY - (128PK/CA)

## (undated) DEVICE — CONTAINER SPECIMEN BAG OR - STERILE 4 OZ W/LID (100EA/CA)

## (undated) DEVICE — NEEDLE NON SAFETY HYPO 22 GA X 1 1/2 IN (100/BX)

## (undated) DEVICE — SYRINGE ASEPTO - (50EA/CA

## (undated) DEVICE — CLOSURE SKIN STRIP 1/2 X 4 IN - (STERI STRIP) (50/BX 4BX/CA)

## (undated) DEVICE — PROTECTOR ULNA NERVE - (36PR/CA)

## (undated) DEVICE — GLOVE BIOGEL PI INDICATOR SZ 8.0 SURGICAL PF LF -(50/BX 4BX/CA)